# Patient Record
Sex: FEMALE | Race: WHITE | NOT HISPANIC OR LATINO | Employment: OTHER | URBAN - NONMETROPOLITAN AREA
[De-identification: names, ages, dates, MRNs, and addresses within clinical notes are randomized per-mention and may not be internally consistent; named-entity substitution may affect disease eponyms.]

---

## 2023-02-02 ENCOUNTER — HOSPITAL ENCOUNTER (INPATIENT)
Facility: HOSPITAL | Age: 73
LOS: 4 days | Discharge: HOME | DRG: 190 | End: 2023-02-07
Attending: FAMILY MEDICINE | Admitting: FAMILY MEDICINE
Payer: MEDICARE

## 2023-02-02 DIAGNOSIS — J44.1 COPD EXACERBATION: Primary | ICD-10-CM

## 2023-02-02 DIAGNOSIS — R05.9 COUGH IN ADULT: ICD-10-CM

## 2023-02-02 DIAGNOSIS — R07.9 CHEST PAIN: ICD-10-CM

## 2023-02-02 LAB
ALBUMIN SERPL-MCNC: 4.2 G/DL (ref 3.4–5)
ALBUMIN/GLOB SERPL: 1.4 RATIO
ALP SERPL-CCNC: 105 UNIT/L (ref 50–144)
ALT SERPL-CCNC: 55 UNIT/L (ref 1–45)
ANION GAP SERPL CALC-SCNC: 7 MEQ/L (ref 2–13)
APPEARANCE UR: CLEAR
AST SERPL-CCNC: 62 UNIT/L (ref 14–36)
BACTERIA #/AREA URNS AUTO: ABNORMAL /HPF
BASE EXCESS BLD CALC-SCNC: 2.3 MMOL/L
BASOPHILS # BLD AUTO: 0.04 X10(3)/MCL (ref 0.01–0.08)
BASOPHILS NFR BLD AUTO: 0.3 % (ref 0.1–1.2)
BILIRUB UR QL STRIP.AUTO: ABNORMAL MG/DL
BILIRUBIN DIRECT+TOT PNL SERPL-MCNC: 0.8 MG/DL (ref 0–1)
BLOOD GAS SAMPLE TYPE (OHS): ABNORMAL
BNP BLD-MCNC: 579 PG/ML (ref 10–450)
BUN SERPL-MCNC: 9 MG/DL (ref 7–20)
CALCIUM SERPL-MCNC: 9.6 MG/DL (ref 8.4–10.2)
CHLORIDE SERPL-SCNC: 102 MMOL/L (ref 98–110)
CO2 SERPL-SCNC: 28 MMOL/L (ref 21–32)
COLOR UR AUTO: YELLOW
CREAT SERPL-MCNC: 0.95 MG/DL (ref 0.66–1.25)
CREAT/UREA NIT SERPL: 9 (ref 12–20)
EOSINOPHIL # BLD AUTO: 0.02 X10(3)/MCL (ref 0.04–0.36)
EOSINOPHIL NFR BLD AUTO: 0.2 % (ref 0.7–7)
ERYTHROCYTE [DISTWIDTH] IN BLOOD BY AUTOMATED COUNT: 12.7 % (ref 11–14.5)
GFR SERPLBLD CREATININE-BSD FMLA CKD-EPI: 65 MLS/MIN/1.73/M2
GLOBULIN SER-MCNC: 3.1 GM/DL (ref 2–3.9)
GLUCOSE SERPL-MCNC: 114 MG/DL (ref 70–115)
GLUCOSE UR QL STRIP.AUTO: NEGATIVE MG/DL
GRAN CASTS URNS QL MICRO: ABNORMAL /LPF
HCO3 BLDA-SCNC: 27.9 MMOL/L
HCT VFR BLD AUTO: 44 % (ref 36–48)
HGB BLD-MCNC: 15.3 GM/DL (ref 11.8–16)
HYALINE CASTS URNS QL MICRO: ABNORMAL /LPF
IMM GRANULOCYTES # BLD AUTO: 0.04 X10(3)/MCL (ref 0–0.03)
IMM GRANULOCYTES NFR BLD AUTO: 0.3 % (ref 0–0.5)
INFLUENZA A (OHS): NEGATIVE
INFLUENZA B (OHS): NEGATIVE
KETONES UR QL STRIP.AUTO: ABNORMAL MG/DL
LACTATE SERPL-SCNC: 1.6 MMOL/L (ref 0.4–2)
LEUKOCYTE ESTERASE UR QL STRIP.AUTO: ABNORMAL UNIT/L
LYMPHOCYTES # BLD AUTO: 2.4 X10(3)/MCL (ref 1.16–3.74)
LYMPHOCYTES NFR BLD AUTO: 18.1 % (ref 20–55)
MAGNESIUM SERPL-MCNC: 1.9 MG/DL (ref 1.8–2.4)
MCH RBC QN AUTO: 31.9 PG (ref 27–34)
MCV RBC AUTO: 91.9 FL (ref 79–99)
MEAN CELL HEMOGLOBIN CONCENTRATION (OHS) G/DL: 34.8 G/DL (ref 31–37)
MONOCYTES # BLD AUTO: 0.92 X10(3)/MCL (ref 0.24–0.36)
MONOCYTES NFR BLD AUTO: 6.9 % (ref 4.7–12.5)
NEUTROPHILS # BLD AUTO: 9.87 X10(3)/MCL (ref 1.56–6.13)
NEUTROPHILS NFR BLD AUTO: 74.2 % (ref 37–73)
NITRITE UR QL STRIP.AUTO: NEGATIVE
NRBC BLD AUTO-RTO: 0 % (ref 0–1)
PCO2 BLDA: 46.6 MMHG (ref 35–45)
PH BLDA: 7.39 [PH] (ref 7.38–7.49)
PH UR STRIP.AUTO: 6.5 [PH]
PLATELET # BLD AUTO: 235 X10(3)/MCL (ref 140–371)
PMV BLD AUTO: 9.8 FL (ref 9.4–12.4)
PO2 BLDA: 67.5 MMHG (ref 80–100)
POTASSIUM SERPL-SCNC: 3.8 MMOL/L (ref 3.5–5.1)
PROT SERPL-MCNC: 7.3 GM/DL (ref 6.3–8.2)
PROT UR QL STRIP.AUTO: >=300 MG/DL
RBC # BLD AUTO: 4.79 X10(6)/MCL (ref 4–5.1)
RBC #/AREA URNS AUTO: ABNORMAL /HPF
RBC UR QL AUTO: ABNORMAL UNIT/L
SAO2 % BLDA: 93.2 %
SARS-COV-2 RDRP RESP QL NAA+PROBE: NEGATIVE
SODIUM SERPL-SCNC: 137 MMOL/L (ref 135–145)
SP GR UR STRIP.AUTO: >=1.03
SQUAMOUS #/AREA URNS AUTO: ABNORMAL /HPF
UROBILINOGEN UR STRIP-ACNC: 1 MG/DL
WBC # SPEC AUTO: 13.3 X10(3)/MCL (ref 4–11.5)
WBC #/AREA URNS AUTO: ABNORMAL /HPF

## 2023-02-02 PROCEDURE — 94799 UNLISTED PULMONARY SVC/PX: CPT

## 2023-02-02 PROCEDURE — 94640 AIRWAY INHALATION TREATMENT: CPT

## 2023-02-02 PROCEDURE — 94640 AIRWAY INHALATION TREATMENT: CPT | Mod: XB

## 2023-02-02 PROCEDURE — 25000242 PHARM REV CODE 250 ALT 637 W/ HCPCS: Performed by: FAMILY MEDICINE

## 2023-02-02 PROCEDURE — 25000003 PHARM REV CODE 250: Performed by: FAMILY MEDICINE

## 2023-02-02 PROCEDURE — 83605 ASSAY OF LACTIC ACID: CPT | Performed by: FAMILY MEDICINE

## 2023-02-02 PROCEDURE — 83735 ASSAY OF MAGNESIUM: CPT | Performed by: FAMILY MEDICINE

## 2023-02-02 PROCEDURE — G0378 HOSPITAL OBSERVATION PER HR: HCPCS

## 2023-02-02 PROCEDURE — 85025 COMPLETE CBC W/AUTO DIFF WBC: CPT | Performed by: FAMILY MEDICINE

## 2023-02-02 PROCEDURE — 81001 URINALYSIS AUTO W/SCOPE: CPT | Performed by: FAMILY MEDICINE

## 2023-02-02 PROCEDURE — 93005 ELECTROCARDIOGRAM TRACING: CPT

## 2023-02-02 PROCEDURE — 63600175 PHARM REV CODE 636 W HCPCS: Performed by: FAMILY MEDICINE

## 2023-02-02 PROCEDURE — 99900035 HC TECH TIME PER 15 MIN (STAT)

## 2023-02-02 PROCEDURE — 93010 ELECTROCARDIOGRAM REPORT: CPT | Mod: ,,, | Performed by: INTERNAL MEDICINE

## 2023-02-02 PROCEDURE — 87040 BLOOD CULTURE FOR BACTERIA: CPT | Performed by: FAMILY MEDICINE

## 2023-02-02 PROCEDURE — 87635 SARS-COV-2 COVID-19 AMP PRB: CPT | Performed by: FAMILY MEDICINE

## 2023-02-02 PROCEDURE — 63600175 PHARM REV CODE 636 W HCPCS: Performed by: INTERNAL MEDICINE

## 2023-02-02 PROCEDURE — 87088 URINE BACTERIA CULTURE: CPT | Performed by: FAMILY MEDICINE

## 2023-02-02 PROCEDURE — 94761 N-INVAS EAR/PLS OXIMETRY MLT: CPT

## 2023-02-02 PROCEDURE — 87400 INFLUENZA A/B EACH AG IA: CPT | Performed by: FAMILY MEDICINE

## 2023-02-02 PROCEDURE — 96372 THER/PROPH/DIAG INJ SC/IM: CPT | Performed by: FAMILY MEDICINE

## 2023-02-02 PROCEDURE — 99285 EMERGENCY DEPT VISIT HI MDM: CPT | Mod: 25

## 2023-02-02 PROCEDURE — 83880 ASSAY OF NATRIURETIC PEPTIDE: CPT | Performed by: FAMILY MEDICINE

## 2023-02-02 PROCEDURE — 27000221 HC OXYGEN, UP TO 24 HOURS

## 2023-02-02 PROCEDURE — 36415 COLL VENOUS BLD VENIPUNCTURE: CPT | Performed by: FAMILY MEDICINE

## 2023-02-02 PROCEDURE — 93010 EKG 12-LEAD: ICD-10-PCS | Mod: ,,, | Performed by: INTERNAL MEDICINE

## 2023-02-02 PROCEDURE — 80053 COMPREHEN METABOLIC PANEL: CPT | Performed by: FAMILY MEDICINE

## 2023-02-02 PROCEDURE — 25000003 PHARM REV CODE 250: Performed by: INTERNAL MEDICINE

## 2023-02-02 RX ORDER — ACETAMINOPHEN 325 MG/1
650 TABLET ORAL EVERY 6 HOURS PRN
Status: DISCONTINUED | OUTPATIENT
Start: 2023-02-02 | End: 2023-02-07 | Stop reason: HOSPADM

## 2023-02-02 RX ORDER — IPRATROPIUM BROMIDE AND ALBUTEROL SULFATE 2.5; .5 MG/3ML; MG/3ML
3 SOLUTION RESPIRATORY (INHALATION) EVERY 4 HOURS
Status: DISCONTINUED | OUTPATIENT
Start: 2023-02-02 | End: 2023-02-03

## 2023-02-02 RX ORDER — ALBUTEROL SULFATE 0.83 MG/ML
2.5 SOLUTION RESPIRATORY (INHALATION) EVERY 4 HOURS PRN
Status: DISCONTINUED | OUTPATIENT
Start: 2023-02-02 | End: 2023-02-07 | Stop reason: HOSPADM

## 2023-02-02 RX ORDER — PRAVASTATIN SODIUM 40 MG/1
40 TABLET ORAL NIGHTLY
COMMUNITY
Start: 2022-12-30

## 2023-02-02 RX ORDER — LISINOPRIL 5 MG/1
5 TABLET ORAL DAILY
Status: DISCONTINUED | OUTPATIENT
Start: 2023-02-03 | End: 2023-02-06

## 2023-02-02 RX ORDER — BUDESONIDE 0.5 MG/2ML
INHALANT ORAL 2 TIMES DAILY
COMMUNITY
Start: 2023-01-05

## 2023-02-02 RX ORDER — LISINOPRIL 5 MG/1
5 TABLET ORAL
Status: ON HOLD | COMMUNITY
Start: 2023-01-15 | End: 2023-02-07 | Stop reason: HOSPADM

## 2023-02-02 RX ORDER — ALBUTEROL SULFATE 90 UG/1
AEROSOL, METERED RESPIRATORY (INHALATION)
COMMUNITY

## 2023-02-02 RX ORDER — BUDESONIDE 0.5 MG/2ML
0.5 INHALANT ORAL 2 TIMES DAILY
Status: DISCONTINUED | OUTPATIENT
Start: 2023-02-02 | End: 2023-02-05

## 2023-02-02 RX ORDER — AZITHROMYCIN 250 MG/1
250 TABLET, FILM COATED ORAL DAILY
Status: COMPLETED | OUTPATIENT
Start: 2023-02-03 | End: 2023-02-07

## 2023-02-02 RX ORDER — ALBUTEROL SULFATE 90 UG/1
2 AEROSOL, METERED RESPIRATORY (INHALATION) EVERY 6 HOURS PRN
COMMUNITY
Start: 2023-01-02

## 2023-02-02 RX ORDER — ENOXAPARIN SODIUM 100 MG/ML
40 INJECTION SUBCUTANEOUS EVERY 24 HOURS
Status: DISCONTINUED | OUTPATIENT
Start: 2023-02-02 | End: 2023-02-07 | Stop reason: HOSPADM

## 2023-02-02 RX ORDER — IBUPROFEN 400 MG/1
400 TABLET ORAL EVERY 6 HOURS PRN
Status: DISCONTINUED | OUTPATIENT
Start: 2023-02-02 | End: 2023-02-07 | Stop reason: HOSPADM

## 2023-02-02 RX ORDER — CHOLESTYRAMINE 4 G/9G
POWDER, FOR SUSPENSION ORAL
COMMUNITY

## 2023-02-02 RX ORDER — DEXAMETHASONE SODIUM PHOSPHATE 4 MG/ML
4 INJECTION, SOLUTION INTRA-ARTICULAR; INTRALESIONAL; INTRAMUSCULAR; INTRAVENOUS; SOFT TISSUE
Status: COMPLETED | OUTPATIENT
Start: 2023-02-02 | End: 2023-02-02

## 2023-02-02 RX ORDER — PAROXETINE 10 MG/1
10 TABLET, FILM COATED ORAL DAILY
COMMUNITY

## 2023-02-02 RX ORDER — ALBUTEROL SULFATE 0.83 MG/ML
3 SOLUTION RESPIRATORY (INHALATION)
COMMUNITY

## 2023-02-02 RX ORDER — POTASSIUM CHLORIDE 750 MG/1
10 TABLET, EXTENDED RELEASE ORAL ONCE
Status: COMPLETED | OUTPATIENT
Start: 2023-02-02 | End: 2023-02-02

## 2023-02-02 RX ORDER — TALC
6 POWDER (GRAM) TOPICAL NIGHTLY PRN
Status: DISCONTINUED | OUTPATIENT
Start: 2023-02-02 | End: 2023-02-07 | Stop reason: HOSPADM

## 2023-02-02 RX ORDER — GUAIFENESIN 600 MG/1
600 TABLET, EXTENDED RELEASE ORAL 2 TIMES DAILY
Status: DISCONTINUED | OUTPATIENT
Start: 2023-02-02 | End: 2023-02-07 | Stop reason: HOSPADM

## 2023-02-02 RX ORDER — PAROXETINE 10 MG/1
10 TABLET, FILM COATED ORAL DAILY
Status: DISCONTINUED | OUTPATIENT
Start: 2023-02-03 | End: 2023-02-07 | Stop reason: HOSPADM

## 2023-02-02 RX ORDER — METHYLPREDNISOLONE SOD SUCC 125 MG
80 VIAL (EA) INJECTION EVERY 8 HOURS
Status: DISCONTINUED | OUTPATIENT
Start: 2023-02-02 | End: 2023-02-05

## 2023-02-02 RX ORDER — FUROSEMIDE 10 MG/ML
20 INJECTION INTRAMUSCULAR; INTRAVENOUS ONCE
Status: COMPLETED | OUTPATIENT
Start: 2023-02-02 | End: 2023-02-02

## 2023-02-02 RX ORDER — IPRATROPIUM BROMIDE AND ALBUTEROL SULFATE 2.5; .5 MG/3ML; MG/3ML
3 SOLUTION RESPIRATORY (INHALATION)
Status: COMPLETED | OUTPATIENT
Start: 2023-02-02 | End: 2023-02-02

## 2023-02-02 RX ORDER — SODIUM CHLORIDE 0.9 % (FLUSH) 0.9 %
3 SYRINGE (ML) INJECTION EVERY 6 HOURS PRN
Status: DISCONTINUED | OUTPATIENT
Start: 2023-02-02 | End: 2023-02-07 | Stop reason: HOSPADM

## 2023-02-02 RX ORDER — METHYLPREDNISOLONE SOD SUCC 125 MG
125 VIAL (EA) INJECTION
Status: COMPLETED | OUTPATIENT
Start: 2023-02-02 | End: 2023-02-02

## 2023-02-02 RX ORDER — DEXAMETHASONE SODIUM PHOSPHATE 4 MG/ML
4 INJECTION, SOLUTION INTRAMUSCULAR; INTRAVENOUS
Status: DISCONTINUED | OUTPATIENT
Start: 2023-02-02 | End: 2023-02-02

## 2023-02-02 RX ORDER — ALBUTEROL SULFATE 0.83 MG/ML
2.5 SOLUTION RESPIRATORY (INHALATION) EVERY 4 HOURS PRN
Status: DISCONTINUED | OUTPATIENT
Start: 2023-02-02 | End: 2023-02-02

## 2023-02-02 RX ORDER — DEXAMETHASONE SODIUM PHOSPHATE 4 MG/ML
1 INJECTION, SOLUTION INTRA-ARTICULAR; INTRALESIONAL; INTRAMUSCULAR; INTRAVENOUS; SOFT TISSUE
Status: DISCONTINUED | OUTPATIENT
Start: 2023-02-02 | End: 2023-02-02

## 2023-02-02 RX ORDER — DICLOFENAC SODIUM 75 MG/1
75 TABLET, DELAYED RELEASE ORAL DAILY
COMMUNITY
Start: 2023-01-28

## 2023-02-02 RX ORDER — ONDANSETRON 2 MG/ML
4 INJECTION INTRAMUSCULAR; INTRAVENOUS EVERY 6 HOURS PRN
Status: DISCONTINUED | OUTPATIENT
Start: 2023-02-02 | End: 2023-02-07 | Stop reason: HOSPADM

## 2023-02-02 RX ORDER — ARFORMOTEROL TARTRATE 15 UG/2ML
SOLUTION RESPIRATORY (INHALATION) 2 TIMES DAILY
COMMUNITY
Start: 2023-01-10 | End: 2023-02-02

## 2023-02-02 RX ADMIN — IPRATROPIUM BROMIDE AND ALBUTEROL SULFATE 3 ML: 2.5; .5 SOLUTION RESPIRATORY (INHALATION) at 01:02

## 2023-02-02 RX ADMIN — POTASSIUM CHLORIDE 10 MEQ: 750 TABLET, EXTENDED RELEASE ORAL at 08:02

## 2023-02-02 RX ADMIN — BUDESONIDE INHALATION 0.5 MG: 0.5 SUSPENSION RESPIRATORY (INHALATION) at 10:02

## 2023-02-02 RX ADMIN — ALBUTEROL SULFATE 2.5 MG: 2.5 SOLUTION RESPIRATORY (INHALATION) at 07:02

## 2023-02-02 RX ADMIN — METHYLPREDNISOLONE SODIUM SUCCINATE 125 MG: 125 INJECTION, POWDER, FOR SOLUTION INTRAMUSCULAR; INTRAVENOUS at 01:02

## 2023-02-02 RX ADMIN — ENOXAPARIN SODIUM 40 MG: 40 INJECTION SUBCUTANEOUS at 05:02

## 2023-02-02 RX ADMIN — MELATONIN TAB 3 MG 6 MG: 3 TAB at 08:02

## 2023-02-02 RX ADMIN — METHYLPREDNISOLONE SODIUM SUCCINATE 80 MG: 125 INJECTION, POWDER, FOR SOLUTION INTRAMUSCULAR; INTRAVENOUS at 09:02

## 2023-02-02 RX ADMIN — IPRATROPIUM BROMIDE AND ALBUTEROL SULFATE 3 ML: 2.5; .5 SOLUTION RESPIRATORY (INHALATION) at 10:02

## 2023-02-02 RX ADMIN — DEXAMETHASONE SODIUM PHOSPHATE 4 MG: 4 INJECTION, SOLUTION INTRA-ARTICULAR; INTRALESIONAL; INTRAMUSCULAR; INTRAVENOUS; SOFT TISSUE at 01:02

## 2023-02-02 RX ADMIN — IPRATROPIUM BROMIDE AND ALBUTEROL SULFATE 3 ML: 2.5; .5 SOLUTION RESPIRATORY (INHALATION) at 05:02

## 2023-02-02 RX ADMIN — ACETAMINOPHEN 650 MG: 325 TABLET, FILM COATED ORAL at 08:02

## 2023-02-02 RX ADMIN — CEFTRIAXONE 1 G: 1 INJECTION, SOLUTION INTRAVENOUS at 02:02

## 2023-02-02 RX ADMIN — GUAIFENESIN 600 MG: 600 TABLET, EXTENDED RELEASE ORAL at 08:02

## 2023-02-02 RX ADMIN — FUROSEMIDE 20 MG: 10 INJECTION, SOLUTION INTRAVENOUS at 09:02

## 2023-02-02 NOTE — ED PROVIDER NOTES
Encounter Date: 2/2/2023       History     Chief Complaint   Patient presents with    Cough     PT WAS SENT FROM URGENT CARE. AMB TO ED WITH C/O SOB, FEVER , H/A , COUGHING UP GREEN MUCUS. BLOOD IN HER URINE    Shortness of Breath     PATIENT IS A 72-YEAR-OLD FEMALE WITH A HISTORY OF COPD, SHE IS FOLLOWED BY AN OUTSIDE PCP AND PULMONOLOGIST.  SHE DOES CONTINUE TO SMOKE APPROXIMATELY HALF PACK PER DAY.  SHE RELATES 3 DAY HISTORY OF COUGH PRODUCTIVE OF GREENISH SPUTUM, SUBJECTIVE FEVERS.  SHE ALSO HAS A LONG HISTORY OF MICROSCOPIC HEMATURIA WHICH HAS BEEN FOLLOWED BY UROLOGIST.  SHE DOES RELATE CHANGES IN HER URINE TODAY WITHOUT SUPRAPUBIC PAIN OR DYSURIA.  SHE WAS SENT TO THE ED FROM URGENT CARE      Review of patient's allergies indicates:   Allergen Reactions    Amoxicillin     Nitrofuran analogues      Past Medical History:   Diagnosis Date    COPD (chronic obstructive pulmonary disease)     Emphysema of lung     Unspecified chronic bronchitis      Past Surgical History:   Procedure Laterality Date    CHOLECYSTECTOMY      EYE SURGERY      HYSTERECTOMY       History reviewed. No pertinent family history.  Social History     Tobacco Use    Smoking status: Every Day     Packs/day: 0.50     Types: Cigarettes    Smokeless tobacco: Never   Substance Use Topics    Alcohol use: Never    Drug use: Never     Review of Systems   Constitutional:  Positive for fever.   Respiratory:  Positive for cough and shortness of breath.         GREEN SPUTUM     Gastrointestinal: Negative.    Genitourinary:         Reports changes in urinary color     Physical Exam     Initial Vitals [02/02/23 1150]   BP Pulse Resp Temp SpO2   126/63 75 20 97.3 °F (36.3 °C) (!) 93 %      MAP       --         Physical Exam    Constitutional: She appears well-developed and well-nourished.   HENT:   Head: Normocephalic and atraumatic.   Cardiovascular:  Normal rate, regular rhythm and normal heart sounds.           Pulmonary/Chest: She is in respiratory  distress. She has wheezes. She has rhonchi.   Abdominal: Abdomen is soft. Bowel sounds are normal.   Musculoskeletal:         General: Normal range of motion.     Neurological: She is alert and oriented to person, place, and time.       ED Course   Procedures  Labs Reviewed   COMPREHENSIVE METABOLIC PANEL - Abnormal; Notable for the following components:       Result Value    Alanine Aminotransferase 55 (*)     Aspartate Aminotransferase 62 (*)     BUN/Creatinine Ratio 9 (*)     All other components within normal limits   CBC WITH DIFFERENTIAL - Abnormal; Notable for the following components:    WBC 13.3 (*)     Neut % 74.2 (*)     Lymph % 18.1 (*)     Eos % 0.2 (*)     Neut # 9.87 (*)     Mono # 0.92 (*)     Eos # 0.02 (*)     IG# 0.04 (*)     All other components within normal limits   URINALYSIS - Abnormal; Notable for the following components:    Protein, UA >=300 (*)     Ketones, UA Trace (*)     Blood, UA Large (*)     Bilirubin, UA Moderate (*)     Leukocyte Esterase, UA Trace (*)     All other components within normal limits    Narrative:      URINE STABILITY IS 2 HOURS AT ROOM TEMP OR    SIX HOURS REFRIGERATED. PERFORMING TESTING ON    SPECIMENS GREATER THAN THIS AGE MAY AFFECT THE    FOLLOWING TESTS:    PH          SPECIFIC GRAVITY           BLOOD    CLARITY     BILIRUBIN               UROBILINOGEN   BLOOD GAS - Abnormal; Notable for the following components:    pCO2, Blood gas 46.6 (*)     pO2, Blood gas 67.5 (*)     All other components within normal limits   NT-PRO NATRIURETIC PEPTIDE - Abnormal; Notable for the following components:    ProBNP 579 (*)     All other components within normal limits   URINALYSIS, MICROSCOPIC - Abnormal; Notable for the following components:    Bacteria, UA Trace (*)     Hyaline Casts, UA Few (*)     Granular Casts, UA Few (*)     RBC, UA 6-10 (*)     WBC, UA 6-10 (*)     All other components within normal limits   RAPID INFLUENZA A/B - Normal   LACTIC ACID, PLASMA - Normal    SARS-COV-2 RNA AMPLIFICATION, QUAL - Normal   MAGNESIUM - Normal   BLOOD CULTURE [facility]   BLOOD CULTURE [facility]   CULTURE, URINE   CBC W/ AUTO DIFFERENTIAL    Narrative:     The following orders were created for panel order CBC auto differential.  Procedure                               Abnormality         Status                     ---------                               -----------         ------                     CBC with Differential[[id number]]        Abnormal            Final result                 Please view results for these tests on the individual orders.     EKG Readings: (Independently Interpreted)   Rhythm: Normal Sinus Rhythm. Heart Rate: 68. Ectopy: No Ectopy. Conduction: Normal. ST Segments: Normal ST Segments. T Waves: Normal. Clinical Impression: Normal Sinus Rhythm     Imaging Results              X-Ray Chest AP Portable (Final result)  Result time 02/02/23 13:10:13      Final result by Lino Lafleur III, MD (02/02/23 13:10:13)                   Impression:      1. Chronic changes are present as described above including findings compatible with COPD.  2. I see no definite infiltrates or other significant abnormalities.      Electronically signed by: Lino Lafleur  Date:    02/02/2023  Time:    13:10               Narrative:    EXAMINATION:  STUDY: XR CHEST AP PORTABLE    CLINICAL HISTORY AND TECHNIQUE:  RT Ismael on 2/2/2023  1:06 PM    CURRENT CLINICAL HISTORY: ER PT    X 1-2 DAYS    -SOB, FEVER, HEADACHE, COUGH    PAST MEDICAL HISTORY: COPD, SMOKER, CV    TECHNIQUE: 1 VIEW CHEST PORTABLE    TECHNOLOGIST: KIERSTEN    COMPARISON:  None    FINDINGS:  The cardiac and mediastinal contours are unremarkable.  The lungs are moderately hyperexpanded with prominence of proximal pulmonary vasculature and mildly increased interstitial lung markings compatible with patient's history of COPD.I see no lobar or segmental infiltrates.No significant pleural effusions are noted.There is moderate demineralization of  the skeletal structures.  Mild atherosclerotic plaquing is noted within the aortic arch.                                       Medications   cefTRIAXone (ROCEPHIN) 1 g/50 mL D5W IVPB (has no administration in time range)   albuterol-ipratropium 2.5 mg-0.5 mg/3 mL nebulizer solution 3 mL (3 mLs Nebulization Given 2/2/23 1346)   methylPREDNISolone sodium succinate injection 125 mg (125 mg Intramuscular Given 2/2/23 1346)   dexAMETHasone injection 4 mg (4 mg Other Given 2/2/23 1347)                              Clinical Impression:   Final diagnoses:  [R05.9] Cough in adult  [R07.9] Chest pain  [J44.1] COPD exacerbation (Primary)        ED Disposition Condition    Observation Stable                Jason Shen MD  02/02/23 1374

## 2023-02-03 PROBLEM — F32.A DEPRESSION: Status: ACTIVE | Noted: 2023-02-03

## 2023-02-03 PROBLEM — J44.1 COPD EXACERBATION: Status: ACTIVE | Noted: 2023-02-03

## 2023-02-03 PROBLEM — I10 HTN (HYPERTENSION): Status: ACTIVE | Noted: 2023-02-03

## 2023-02-03 LAB
ANION GAP SERPL CALC-SCNC: 6 MEQ/L (ref 2–13)
BASOPHILS # BLD AUTO: 0 X10(3)/MCL (ref 0.01–0.08)
BASOPHILS NFR BLD AUTO: 0 % (ref 0.1–1.2)
BUN SERPL-MCNC: 20 MG/DL (ref 7–20)
CALCIUM SERPL-MCNC: 8.8 MG/DL (ref 8.4–10.2)
CHLORIDE SERPL-SCNC: 101 MMOL/L (ref 98–110)
CO2 SERPL-SCNC: 27 MMOL/L (ref 21–32)
CREAT SERPL-MCNC: 0.91 MG/DL (ref 0.66–1.25)
CREAT/UREA NIT SERPL: 22 (ref 12–20)
EOSINOPHIL # BLD AUTO: 0 X10(3)/MCL (ref 0.04–0.36)
EOSINOPHIL NFR BLD AUTO: 0 % (ref 0.7–7)
ERYTHROCYTE [DISTWIDTH] IN BLOOD BY AUTOMATED COUNT: 12.7 % (ref 11–14.5)
GFR SERPLBLD CREATININE-BSD FMLA CKD-EPI: 68 MLS/MIN/1.73/M2
GLUCOSE SERPL-MCNC: 182 MG/DL (ref 70–115)
HCT VFR BLD AUTO: 41.1 % (ref 36–48)
HGB BLD-MCNC: 14.2 GM/DL (ref 11.8–16)
IMM GRANULOCYTES # BLD AUTO: 0.02 X10(3)/MCL (ref 0–0.03)
IMM GRANULOCYTES NFR BLD AUTO: 0.2 % (ref 0–0.5)
LYMPHOCYTES # BLD AUTO: 1.24 X10(3)/MCL (ref 1.16–3.74)
LYMPHOCYTES NFR BLD AUTO: 12.5 % (ref 20–55)
MAGNESIUM SERPL-MCNC: 2 MG/DL (ref 1.8–2.4)
MCH RBC QN AUTO: 31.8 PG (ref 27–34)
MCV RBC AUTO: 92.2 FL (ref 79–99)
MEAN CELL HEMOGLOBIN CONCENTRATION (OHS) G/DL: 34.5 G/DL (ref 31–37)
MONOCYTES # BLD AUTO: 0.25 X10(3)/MCL (ref 0.24–0.36)
MONOCYTES NFR BLD AUTO: 2.5 % (ref 4.7–12.5)
NEUTROPHILS # BLD AUTO: 8.43 X10(3)/MCL (ref 1.56–6.13)
NEUTROPHILS NFR BLD AUTO: 84.8 % (ref 37–73)
NRBC BLD AUTO-RTO: 0 % (ref 0–1)
PLATELET # BLD AUTO: 249 X10(3)/MCL (ref 140–371)
PMV BLD AUTO: 10.1 FL (ref 9.4–12.4)
POTASSIUM SERPL-SCNC: 4.2 MMOL/L (ref 3.5–5.1)
RBC # BLD AUTO: 4.46 X10(6)/MCL (ref 4–5.1)
SODIUM SERPL-SCNC: 134 MMOL/L (ref 135–145)
WBC # SPEC AUTO: 9.9 X10(3)/MCL (ref 4–11.5)

## 2023-02-03 PROCEDURE — 83735 ASSAY OF MAGNESIUM: CPT | Performed by: INTERNAL MEDICINE

## 2023-02-03 PROCEDURE — 94761 N-INVAS EAR/PLS OXIMETRY MLT: CPT

## 2023-02-03 PROCEDURE — 63600175 PHARM REV CODE 636 W HCPCS: Performed by: FAMILY MEDICINE

## 2023-02-03 PROCEDURE — 87070 CULTURE OTHR SPECIMN AEROBIC: CPT | Performed by: INTERNAL MEDICINE

## 2023-02-03 PROCEDURE — 36415 COLL VENOUS BLD VENIPUNCTURE: CPT | Performed by: HOSPITALIST

## 2023-02-03 PROCEDURE — 25000003 PHARM REV CODE 250: Performed by: FAMILY MEDICINE

## 2023-02-03 PROCEDURE — 85025 COMPLETE CBC W/AUTO DIFF WBC: CPT | Performed by: HOSPITALIST

## 2023-02-03 PROCEDURE — 27000221 HC OXYGEN, UP TO 24 HOURS

## 2023-02-03 PROCEDURE — 80048 BASIC METABOLIC PNL TOTAL CA: CPT | Performed by: HOSPITALIST

## 2023-02-03 PROCEDURE — 99900035 HC TECH TIME PER 15 MIN (STAT)

## 2023-02-03 PROCEDURE — 25000242 PHARM REV CODE 250 ALT 637 W/ HCPCS: Performed by: FAMILY MEDICINE

## 2023-02-03 PROCEDURE — 25000003 PHARM REV CODE 250: Performed by: INTERNAL MEDICINE

## 2023-02-03 PROCEDURE — 63700000 PHARM REV CODE 250 ALT 637 W/O HCPCS: Performed by: INTERNAL MEDICINE

## 2023-02-03 PROCEDURE — 11000001 HC ACUTE MED/SURG PRIVATE ROOM

## 2023-02-03 PROCEDURE — 94640 AIRWAY INHALATION TREATMENT: CPT

## 2023-02-03 RX ADMIN — IPRATROPIUM BROMIDE AND ALBUTEROL SULFATE 3 ML: 2.5; .5 SOLUTION RESPIRATORY (INHALATION) at 08:02

## 2023-02-03 RX ADMIN — ENOXAPARIN SODIUM 40 MG: 40 INJECTION SUBCUTANEOUS at 05:02

## 2023-02-03 RX ADMIN — CEFTRIAXONE SODIUM 1 G: 1 INJECTION, POWDER, FOR SOLUTION INTRAMUSCULAR; INTRAVENOUS at 09:02

## 2023-02-03 RX ADMIN — ACETAMINOPHEN 650 MG: 325 TABLET, FILM COATED ORAL at 06:02

## 2023-02-03 RX ADMIN — METHYLPREDNISOLONE SODIUM SUCCINATE 80 MG: 125 INJECTION, POWDER, FOR SOLUTION INTRAMUSCULAR; INTRAVENOUS at 05:02

## 2023-02-03 RX ADMIN — GUAIFENESIN 600 MG: 600 TABLET, EXTENDED RELEASE ORAL at 08:02

## 2023-02-03 RX ADMIN — GUAIFENESIN 600 MG: 600 TABLET, EXTENDED RELEASE ORAL at 09:02

## 2023-02-03 RX ADMIN — BUDESONIDE INHALATION 0.5 MG: 0.5 SUSPENSION RESPIRATORY (INHALATION) at 08:02

## 2023-02-03 RX ADMIN — AZITHROMYCIN MONOHYDRATE 250 MG: 250 TABLET ORAL at 09:02

## 2023-02-03 RX ADMIN — PAROXETINE 10 MG: 10 TABLET, FILM COATED ORAL at 09:02

## 2023-02-03 RX ADMIN — ALBUTEROL SULFATE 2.5 MG: 2.5 SOLUTION RESPIRATORY (INHALATION) at 03:02

## 2023-02-03 RX ADMIN — ALBUTEROL SULFATE 2.5 MG: 2.5 SOLUTION RESPIRATORY (INHALATION) at 08:02

## 2023-02-03 RX ADMIN — MELATONIN TAB 3 MG 6 MG: 3 TAB at 08:02

## 2023-02-03 RX ADMIN — ALBUTEROL SULFATE 2.5 MG: 2.5 SOLUTION RESPIRATORY (INHALATION) at 11:02

## 2023-02-03 RX ADMIN — METHYLPREDNISOLONE SODIUM SUCCINATE 80 MG: 125 INJECTION, POWDER, FOR SOLUTION INTRAMUSCULAR; INTRAVENOUS at 09:02

## 2023-02-03 RX ADMIN — METHYLPREDNISOLONE SODIUM SUCCINATE 80 MG: 125 INJECTION, POWDER, FOR SOLUTION INTRAMUSCULAR; INTRAVENOUS at 03:02

## 2023-02-03 RX ADMIN — IPRATROPIUM BROMIDE AND ALBUTEROL SULFATE 3 ML: 2.5; .5 SOLUTION RESPIRATORY (INHALATION) at 04:02

## 2023-02-03 RX ADMIN — LISINOPRIL 5 MG: 5 TABLET ORAL at 09:02

## 2023-02-03 NOTE — H&P
[facility] Medicine  History & Physical    Patient Name: Shira Morales  MRN: [id number]  Patient Class: OP- Observation  Admission Date: 2/2/2023  Attending Physician: Anthony Claudio MD  Primary Care Provider: Primary Doctor No         Patient information was obtained from via telemed    Subjective:     Principal Problem:<principal problem not specified>    Chief Complaint:   Chief Complaint   Patient presents with    Cough     PT WAS SENT FROM URGENT CARE. AMB TO ED WITH C/O SOB, FEVER , H/A , COUGHING UP GREEN MUCUS. BLOOD IN HER URINE    Shortness of Breath        HPI: 71 yo female with hx of COPD continues to smoke, HTN, Depression presents with 3 day hx of productive cough yellow green, fever, urinary symptoms. Stating she has been coughing SOB, wheezing, No N/V/D.      Pt is Full code SDM is her daughter   Pt is see and examined via Telemed. Pt is in [facility]. I am in [place], [place]. Nursing staff assisted with evaluation. Software is Audio/ Video  Pt is being admitted as an observation for further evaluation and treatment      Past Medical History:   Diagnosis Date    Chronic neck pain     COPD (chronic obstructive pulmonary disease)     Emphysema of lung     Unspecified chronic bronchitis        Past Surgical History:   Procedure Laterality Date    CHOLECYSTECTOMY      EYE SURGERY      HYSTERECTOMY         Review of patient's allergies indicates:   Allergen Reactions    Amoxicillin     Nitrofuran analogues        No current facility-administered medications on file prior to encounter.     Current Outpatient Medications on File Prior to Encounter   Medication Sig    albuterol (PROVENTIL) 2.5 mg /3 mL (0.083 %) nebulizer solution 3 mLs.    albuterol (PROVENTIL/VENTOLIN HFA) 90 mcg/actuation inhaler 2 puffs every 6 (six) hours as needed.    albuterol (PROVENTIL/VENTOLIN HFA) 90 mcg/actuation inhaler Ventolin HFA 90 mcg/actuation aerosol inhaler    budesonide  (PULMICORT) 0.5 mg/2 mL nebulizer solution Take by nebulization 2 (two) times daily.    cholestyramine (QUESTRAN) 4 gram packet cholestyramine (with sugar) 4 gram powder for susp in a packet    diclofenac (VOLTAREN) 75 MG EC tablet Take 75 mg by mouth 2 (two) times daily.    lisinopriL (PRINIVIL,ZESTRIL) 5 MG tablet Take 5 mg by mouth.    paroxetine (PAXIL) 10 MG tablet Take 10 mg by mouth once daily.    pravastatin (PRAVACHOL) 40 MG tablet Take 40 mg by mouth every evening.    [DISCONTINUED] arformoteroL (BROVANA) 15 mcg/2 mL Nebu Take by nebulization 2 (two) times daily.     Family History    None       Tobacco Use    Smoking status: Every Day     Packs/day: 0.50     Types: Cigarettes    Smokeless tobacco: Never   Substance and Sexual Activity    Alcohol use: Never    Drug use: Never    Sexual activity: Not on file     Review of Systems   Constitutional:  Positive for fever.   HENT: Negative.     Eyes: Negative.    Respiratory:  Positive for cough, shortness of breath and wheezing.    Cardiovascular: Negative.    Endocrine: Negative.    Genitourinary: Negative.    Musculoskeletal: Negative.    Skin: Negative.    Allergic/Immunologic: Negative.    Neurological: Negative.    Hematological: Negative.    Psychiatric/Behavioral: Negative.     Objective:     Vital Signs (Most Recent):  Temp: 97.7 °F (36.5 °C) (02/02/23 1812)  Pulse: 71 (02/02/23 1936)  Resp: 18 (02/02/23 1936)  BP: 114/63 (02/02/23 1812)  SpO2: (!) 93 % (02/02/23 1936)   Vital Signs (24h Range):  Temp:  [97.3 °F (36.3 °C)-98 °F (36.7 °C)] 97.7 °F (36.5 °C)  Pulse:  [68-83] 71  Resp:  [18-20] 18  SpO2:  [93 %-96 %] 93 %  BP: (101-137)/(46-73) 114/63     Weight: 66.7 kg (147 lb 1.6 oz)  Body mass index is 23.04 kg/m².    Physical Exam  Constitutional:       Appearance: Normal appearance.   HENT:      Head: Normocephalic and atraumatic.      Nose: Nose normal.   Eyes:      Extraocular Movements: Extraocular movements intact.       Conjunctiva/sclera: Conjunctivae normal.      Pupils: Pupils are equal, round, and reactive to light.   Cardiovascular:      Rate and Rhythm: Normal rate and regular rhythm.      Pulses: Normal pulses.      Heart sounds: Normal heart sounds.   Pulmonary:      Breath sounds: Normal breath sounds.   Abdominal:      General: Abdomen is flat. Bowel sounds are normal.      Palpations: Abdomen is soft.   Musculoskeletal:         General: Normal range of motion.      Cervical back: Normal range of motion and neck supple.   Skin:     General: Skin is warm and dry.   Neurological:      General: No focal deficit present.      Mental Status: She is alert and oriented to person, place, and time. Mental status is at baseline.   Psychiatric:         Mood and Affect: Mood normal.         CRANIAL NERVES     CN III, IV, VI   Pupils are equal, round, and reactive to light.     Significant Labs: All pertinent labs within the past 24 hours have been reviewed.  ABGs: No results for input(s): PH, PCO2, HCO3, POCSATURATED, BE, TOTALHB, COHB, METHB, O2HB, POCFIO2, PO2 in the last 48 hours.  Bilirubin:   Recent Labs   Lab 02/02/23  1301   BILITOT 0.8     Blood Culture: No results for input(s): LABBLOO in the last 48 hours.  BMP:   Recent Labs   Lab 02/02/23  1301      K 3.8   CO2 28   BUN 9.0   CREATININE 0.95   CALCIUM 9.6   MG 1.90     CBC:   Recent Labs   Lab 02/02/23  1301   WBC 13.3*   HGB 15.3   HCT 44.0        CMP:   Recent Labs   Lab 02/02/23  1301      K 3.8   CO2 28   BUN 9.0   CREATININE 0.95   CALCIUM 9.6   ALBUMIN 4.2   BILITOT 0.8   ALKPHOS 105   AST 62*   ALT 55*     Cardiac Markers: No results for input(s): CKMB, MYOGLOBIN, BNP, TROPISTAT in the last 48 hours.  Coagulation: No results for input(s): PT, INR, APTT in the last 48 hours.  Lactic Acid: No results for input(s): LACTATE in the last 48 hours.  Lipase: No results for input(s): LIPASE in the last 48 hours.  Lipid Panel: No results for input(s):  CHOL, HDL, LDLCALC, TRIG, CHOLHDL in the last 48 hours.  Magnesium:   Recent Labs   Lab 02/02/23  1301   MG 1.90     Pathology Results  (Last 10 years)      None          POCT Glucose: No results for input(s): POCTGLUCOSE in the last 48 hours.  Prealbumin: No results for input(s): PREALBUMIN in the last 48 hours.  Respiratory Culture: No results for input(s): GSRESP, RESPIRATORYC in the last 48 hours.  Troponin: No results for input(s): TROPONINI, TROPONINIHS in the last 48 hours.  TSH: No results for input(s): TSH in the last 4320 hours.  Urine Culture: No results for input(s): LABURIN in the last 48 hours.  Urine Studies:   Recent Labs   Lab 02/02/23  1239   APPEARANCEUA Clear   PROTEINUA >=300*   BILIRUBINUA Moderate*   UROBILINOGEN 1.0   LEUKOCYTESUR Trace*   RBCUA 6-10*   WBCUA 6-10*   BACTERIA Trace*       Significant Imaging:   CXR COPD changes, no infiltrate    Assessment/Plan:     No notes have been filed under this hospital service.  Service: Hospital Medicine    VTE Risk Mitigation (From admission, onward)         Ordered     enoxaparin injection 40 mg  Daily         02/02/23 1642     IP VTE HIGH RISK PATIENT  Once         02/02/23 1642            -Acute hypoxic resp failure due to copd exacerbation add oxygen per NC. Will give one dose lasix IV once  -Sepsis due to UTI/ COPD exacerbation/ acute bronchitis  -COPD exacerbation with acute bronchitis Continue Solumedrol  IV, Add azithromycin Mucinex, Nebs  - UTI urine culture obtained, Will continue on Rocephin  -HTN continue on Lisinopril  -Depression continue SSRI  -Tob abuse pt was counseled to quit smoking  -SVT ppx on Lovenox   Pt is Full code SDM is her daughter Susan Garcia  Pt is see and examined via Telemed. Pt is in [facility]. I am in [place], [place]. Nursing staff assisted with evaluation. Software is Audio/ Video  Pt is being admitted as an observation for further evaluation and treatment        Anthony Claudio MD  Department of Hospital Medicine    [facility]

## 2023-02-03 NOTE — PROGRESS NOTES
[facility] Medicine  Progress Note    Patient Name: Shira Morales  MRN: [id number]  Patient Class: OP- Observation   Admission Date: 2/2/2023  Length of Stay: 0 days  Attending Physician: Anne Marie Iyer MD  Primary Care Provider: Primary Doctor No        Subjective:     Principal Problem:COPD exacerbation        HPI:  71 yo female with hx of COPD continues to smoke, HTN, Depression presents with 3 day hx of productive cough yellow green, fever, urinary symptoms. Stating she has been coughing SOB, wheezing, No N/V/D.      Pt is Full code SDM is her daughter   Pt is see and examined via Telemed. Pt is in [facility]. I am in [place], [place]. Nursing staff assisted with evaluation. Software is Audio/ Video  Pt is being admitted as an observation for further evaluation and treatment      Overview/Hospital Course:  February 3, 2023 patient doing better.  Less wheezing.  She is admitting to still smoking 8 cigarettes per day.  Smoking cessation counseling done.      Interval History:     Review of Systems   Constitutional:  Negative for activity change, appetite change, chills, diaphoresis, fatigue and fever.   HENT:  Negative for congestion, ear pain, rhinorrhea and sore throat.    Eyes:  Negative for pain, discharge and redness.   Respiratory:  Positive for cough, shortness of breath and wheezing. Negative for apnea, choking and chest tightness.    Cardiovascular:  Negative for chest pain, palpitations and leg swelling.   Gastrointestinal:  Negative for abdominal distention, abdominal pain, blood in stool, constipation, diarrhea, nausea and vomiting.   Endocrine: Negative for cold intolerance, heat intolerance and polyuria.   Genitourinary:  Negative for difficulty urinating, dysuria, frequency, hematuria and pelvic pain.   Musculoskeletal:  Negative for arthralgias, back pain, gait problem, joint swelling and myalgias.   Skin:  Negative for color change, pallor, rash and wound.    Neurological:  Negative for seizures, syncope, speech difficulty, numbness and headaches.   Psychiatric/Behavioral:  Negative for agitation, confusion, hallucinations and sleep disturbance. The patient is not nervous/anxious.    Objective:     Vital Signs (Most Recent):  Temp: 97.6 °F (36.4 °C) (02/03/23 1105)  Pulse: 68 (02/03/23 1153)  Resp: 20 (02/03/23 1153)  BP: (!) 154/73 (02/03/23 1105)  SpO2: 96 % (02/03/23 1153)   Vital Signs (24h Range):  Temp:  [97.6 °F (36.4 °C)-98 °F (36.7 °C)] 97.6 °F (36.4 °C)  Pulse:  [58-83] 68  Resp:  [16-22] 20  SpO2:  [91 %-96 %] 96 %  BP: ()/(46-73) 154/73     Weight: 66.7 kg (147 lb 1.6 oz)  Body mass index is 23.04 kg/m².    Intake/Output Summary (Last 24 hours) at 2/3/2023 1307  Last data filed at 2/3/2023 1242  Gross per 24 hour   Intake 720 ml   Output --   Net 720 ml      Physical Exam  Constitutional:       General: She is not in acute distress.     Appearance: Normal appearance. She is not ill-appearing, toxic-appearing or diaphoretic.   HENT:      Head: Normocephalic and atraumatic.      Right Ear: External ear normal.      Left Ear: External ear normal.      Nose: Nose normal. No congestion or rhinorrhea.      Mouth/Throat:      Mouth: Mucous membranes are moist.      Pharynx: Oropharynx is clear.   Eyes:      Extraocular Movements: Extraocular movements intact.      Conjunctiva/sclera: Conjunctivae normal.      Pupils: Pupils are equal, round, and reactive to light.   Neck:      Vascular: No carotid bruit.   Cardiovascular:      Rate and Rhythm: Normal rate and regular rhythm.      Pulses: Normal pulses.      Heart sounds: Normal heart sounds. No murmur heard.  Pulmonary:      Effort: Pulmonary effort is normal. No respiratory distress.      Breath sounds: Wheezing, rhonchi and rales present.   Abdominal:      General: Abdomen is flat. Bowel sounds are normal. There is no distension.      Palpations: Abdomen is soft.      Tenderness: There is no abdominal  tenderness. There is no guarding.   Musculoskeletal:         General: No swelling or tenderness. Normal range of motion.      Cervical back: Normal range of motion and neck supple. No tenderness.      Right lower leg: No edema.      Left lower leg: No edema.   Lymphadenopathy:      Cervical: No cervical adenopathy.   Skin:     General: Skin is warm and dry.      Coloration: Skin is not jaundiced or pale.   Neurological:      General: No focal deficit present.      Mental Status: She is alert and oriented to person, place, and time. Mental status is at baseline.      Cranial Nerves: No cranial nerve deficit.      Motor: No weakness.      Coordination: Coordination normal.      Gait: Gait normal.   Psychiatric:         Mood and Affect: Mood normal.         Behavior: Behavior normal.         Thought Content: Thought content normal.         Judgment: Judgment normal.       Significant Labs: All pertinent labs within the past 24 hours have been reviewed.    Significant Imaging: I have reviewed all pertinent imaging results/findings within the past 24 hours.      Assessment/Plan:      No notes have been filed under this hospital service.  Service: Hospital Medicine    VTE Risk Mitigation (From admission, onward)         Ordered     enoxaparin injection 40 mg  Daily         02/02/23 1642     IP VTE HIGH RISK PATIENT  Once         02/02/23 1642                Discharge Planning   INGA:      Code Status: Full Code   Is the patient medically ready for discharge?:     Reason for patient still in hospital (select all that apply): Patient unstable  Discharge Plan A: Home                  Georgi Jackson MD  Department of Hospital Medicine   [facility]

## 2023-02-03 NOTE — SUBJECTIVE & OBJECTIVE
Interval History:     Review of Systems   Constitutional:  Negative for activity change, appetite change, chills, diaphoresis, fatigue and fever.   HENT:  Negative for congestion, ear pain, rhinorrhea and sore throat.    Eyes:  Negative for pain, discharge and redness.   Respiratory:  Positive for cough, shortness of breath and wheezing. Negative for apnea, choking and chest tightness.    Cardiovascular:  Negative for chest pain, palpitations and leg swelling.   Gastrointestinal:  Negative for abdominal distention, abdominal pain, blood in stool, constipation, diarrhea, nausea and vomiting.   Endocrine: Negative for cold intolerance, heat intolerance and polyuria.   Genitourinary:  Negative for difficulty urinating, dysuria, frequency, hematuria and pelvic pain.   Musculoskeletal:  Negative for arthralgias, back pain, gait problem, joint swelling and myalgias.   Skin:  Negative for color change, pallor, rash and wound.   Neurological:  Negative for seizures, syncope, speech difficulty, numbness and headaches.   Psychiatric/Behavioral:  Negative for agitation, confusion, hallucinations and sleep disturbance. The patient is not nervous/anxious.    Objective:     Vital Signs (Most Recent):  Temp: 97.6 °F (36.4 °C) (02/03/23 1105)  Pulse: 68 (02/03/23 1153)  Resp: 20 (02/03/23 1153)  BP: (!) 154/73 (02/03/23 1105)  SpO2: 96 % (02/03/23 1153)   Vital Signs (24h Range):  Temp:  [97.6 °F (36.4 °C)-98 °F (36.7 °C)] 97.6 °F (36.4 °C)  Pulse:  [58-83] 68  Resp:  [16-22] 20  SpO2:  [91 %-96 %] 96 %  BP: ()/(46-73) 154/73     Weight: 66.7 kg (147 lb 1.6 oz)  Body mass index is 23.04 kg/m².    Intake/Output Summary (Last 24 hours) at 2/3/2023 1307  Last data filed at 2/3/2023 1242  Gross per 24 hour   Intake 720 ml   Output --   Net 720 ml      Physical Exam  Constitutional:       General: She is not in acute distress.     Appearance: Normal appearance. She is not ill-appearing, toxic-appearing or diaphoretic.   HENT:       Head: Normocephalic and atraumatic.      Right Ear: External ear normal.      Left Ear: External ear normal.      Nose: Nose normal. No congestion or rhinorrhea.      Mouth/Throat:      Mouth: Mucous membranes are moist.      Pharynx: Oropharynx is clear.   Eyes:      Extraocular Movements: Extraocular movements intact.      Conjunctiva/sclera: Conjunctivae normal.      Pupils: Pupils are equal, round, and reactive to light.   Neck:      Vascular: No carotid bruit.   Cardiovascular:      Rate and Rhythm: Normal rate and regular rhythm.      Pulses: Normal pulses.      Heart sounds: Normal heart sounds. No murmur heard.  Pulmonary:      Effort: Pulmonary effort is normal. No respiratory distress.      Breath sounds: Wheezing, rhonchi and rales present.   Abdominal:      General: Abdomen is flat. Bowel sounds are normal. There is no distension.      Palpations: Abdomen is soft.      Tenderness: There is no abdominal tenderness. There is no guarding.   Musculoskeletal:         General: No swelling or tenderness. Normal range of motion.      Cervical back: Normal range of motion and neck supple. No tenderness.      Right lower leg: No edema.      Left lower leg: No edema.   Lymphadenopathy:      Cervical: No cervical adenopathy.   Skin:     General: Skin is warm and dry.      Coloration: Skin is not jaundiced or pale.   Neurological:      General: No focal deficit present.      Mental Status: She is alert and oriented to person, place, and time. Mental status is at baseline.      Cranial Nerves: No cranial nerve deficit.      Motor: No weakness.      Coordination: Coordination normal.      Gait: Gait normal.   Psychiatric:         Mood and Affect: Mood normal.         Behavior: Behavior normal.         Thought Content: Thought content normal.         Judgment: Judgment normal.       Significant Labs: All pertinent labs within the past 24 hours have been reviewed.    Significant Imaging: I have reviewed all pertinent  imaging results/findings within the past 24 hours.

## 2023-02-03 NOTE — PLAN OF CARE
02/03/23 1013   Discharge Assessment   Assessment Type Discharge Planning Assessment   Confirmed/corrected address, phone number and insurance Yes   Confirmed Demographics Correct on Facesheet   Source of Information patient   Communicated INGA with patient/caregiver Date not available/Unable to determine   Reason For Admission COPD Exacerbation, UTI   People in Home alone   Do you expect to return to your current living situation? Yes   Do you have help at home or someone to help you manage your care at home? No   Prior to hospitilization cognitive status: Alert/Oriented   Current cognitive status: Alert/Oriented   Equipment Currently Used at Home nebulizer  (Has a walker, BSC, Wheelchair, [place] at home but does not use.)   Readmission within 30 days? No   Patient currently being followed by outpatient case management? No   Do you currently have service(s) that help you manage your care at home? No   Do you take prescription medications? Yes   Do you have prescription coverage? Yes   Coverage [employer] and Medicaid   Do you have any problems affording any of your prescribed medications? No   Is the patient taking medications as prescribed? yes   Who is going to help you get home at discharge? Bere Garcia-Daughter  [phone]   How do you get to doctors appointments? car, drives self   Are you on dialysis? No   Do you take coumadin? No   Discharge Plan A Home   Discharge Plan B Home Health   DME Needed Upon Discharge  none   Discharge Plan discussed with: Patient   Discharge Barriers Identified None   Physical Activity   On average, how many days per week do you engage in moderate to strenuous exercise (like a brisk walk)? 3 days   On average, how many minutes do you engage in exercise at this level? 20 min   Financial Resource Strain   How hard is it for you to pay for the very basics like food, housing, medical care, and heating? Very Hard   Housing Stability   In the last 12 months, was there a time  when you were not able to pay the mortgage or rent on time? N   In the last 12 months, how many places have you lived? 1   In the last 12 months, was there a time when you did not have a steady place to sleep or slept in a shelter (including now)? N   Transportation Needs   In the past 12 months, has lack of transportation kept you from medical appointments or from getting medications? no   In the past 12 months, has lack of transportation kept you from meetings, work, or from getting things needed for daily living? No   Food Insecurity   Within the past 12 months, you worried that your food would run out before you got the money to buy more. Never true   Within the past 12 months, the food you bought just didn't last and you didn't have money to get more. Never true   Stress   Do you feel stress - tense, restless, nervous, or anxious, or unable to sleep at night because your mind is troubled all the time - these days? To some exte   Social Connections   In a typical week, how many times do you talk on the phone with family, friends, or neighbors? More than 3   How often do you get together with friends or relatives? Once   How often do you attend Muslim or Mormonism services? More than 4   Do you belong to any clubs or organizations such as Muslim groups, unions, fraternal or athletic groups, or school groups? No   How often do you attend meetings of the clubs or organizations you belong to? Never   Are you , , , , never , or living with a partner?    Alcohol Use   Q1: How often do you have a drink containing alcohol? Never   Q2: How many drinks containing alcohol do you have on a typical day when you are drinking? None   Q3: How often do you have six or more drinks on one occasion? Never

## 2023-02-03 NOTE — HOSPITAL COURSE
February 3, 2023 patient doing better.  Less wheezing.  She is admitting to still smoking 8 cigarettes per day.  Smoking cessation counseling done.  February 4, 2023 patient is doing great only minimal shortness of breath and breathing treatments  February 5, 2023 patient doing well urine culture no growth sputum culture pending.  Tolerating IV steroids and IV antibiotics  February 6, 2023 patient doing well.  Continues to require O2.  Room air pulse ox 82%.  Patient will need home O2  February 7, 2023 patient breathing back to baseline.  She is qualified for home O2 and will be sent home with O2 at 2 liters/minute nasal cannula.  Sputum culture showed normal hill.  To be sent home on p.o. Levaquin and completion of her Medrol Dosepak your she will follow-up with her pulmonologist in 1-2 weeks.  She will follow up with her primary care provider in 1 week.

## 2023-02-03 NOTE — PROGRESS NOTES
"Inpatient Nutrition Evaluation    Admit Date: 2/2/2023   Total duration of encounter: 1 day    Nutrition Recommendation/Prescription     Continue Regular diet and encourage PO intake as needed.     RD to adjust MNT PRN.    Nutrition Assessment     Chart Review    Reason Seen: continuous nutrition monitoring    Malnutrition Screening Tool Results   Have you recently lost weight without trying?: No  Have you been eating poorly because of a decreased appetite?: No   MST Score: 0     Diagnosis:  Acute hypoxic Respiratory Failure d/t COPD exacerbation, Sepsis d/t UTI/COPD/Acute bronchitis, COPD exacerbation with Acute Bronchitis, UTI, HTN, Depression      Diet Order: Diet Adult Regular (IDDSI Level 7)  Oral Supplement Order: none  Appetite/Oral Intake: good/% of meals  Factors Affecting Nutritional Intake: none identified  Food/Muslim/Cultural Preferences: none reported  Food Allergies: no known food allergies       Wound(s):       Comments    February 3, 2023 patient doing better.  Less wheezing.  She is admitting to still smoking 8 cigarettes per day.  Smoking cessation counseling done.    Anthropometrics    Height: 5' 7" (170.2 cm) Height Method: Stated  Last Weight: 66.7 kg (147 lb 1.6 oz) (02/02/23 1600) Weight Method: Bed Scale  BMI (Calculated): 23  BMI Classification: normal (BMI 18.5-24.9)     Ideal Body Weight (IBW), Female: 135 lb     % Ideal Body Weight, Female (lb): 111.11 %                             Usual Weight Provided By: patient denies unintentional weight loss    Wt Readings from Last 3 Encounters:   02/02/23 1600 66.7 kg (147 lb 1.6 oz)   02/02/23 1150 68 kg (150 lb)      Weight Change(s) Since Admission:  Admit Weight: 68 kg (150 lb) (02/02/23 1150)      Patient Education    Not applicable.    Monitoring & Evaluation     Dietitian will monitor energy intake and weight change.  Nutrition Risk/Follow-Up: low (follow-up in 5-7 days)  Patients assigned 'low nutrition risk' status do not " qualify for a full nutritional assessment but will be monitored and re-evaluated in a 5-7 day time period. Please consult if re-evaluation needed sooner.

## 2023-02-04 LAB
ANION GAP SERPL CALC-SCNC: 6 MEQ/L (ref 2–13)
BASOPHILS # BLD AUTO: 0.02 X10(3)/MCL (ref 0.01–0.08)
BASOPHILS NFR BLD AUTO: 0.1 % (ref 0.1–1.2)
BUN SERPL-MCNC: 24 MG/DL (ref 7–20)
CALCIUM SERPL-MCNC: 9.3 MG/DL (ref 8.4–10.2)
CHLORIDE SERPL-SCNC: 105 MMOL/L (ref 98–110)
CO2 SERPL-SCNC: 26 MMOL/L (ref 21–32)
CREAT SERPL-MCNC: 0.79 MG/DL (ref 0.66–1.25)
CREAT/UREA NIT SERPL: 30 (ref 12–20)
EOSINOPHIL # BLD AUTO: 0 X10(3)/MCL (ref 0.04–0.36)
EOSINOPHIL NFR BLD AUTO: 0 % (ref 0.7–7)
ERYTHROCYTE [DISTWIDTH] IN BLOOD BY AUTOMATED COUNT: 12.9 % (ref 11–14.5)
GFR SERPLBLD CREATININE-BSD FMLA CKD-EPI: 81 MLS/MIN/1.73/M2
GLUCOSE SERPL-MCNC: 142 MG/DL (ref 70–115)
HCT VFR BLD AUTO: 39 % (ref 36–48)
HGB BLD-MCNC: 13.7 GM/DL (ref 11.8–16)
IMM GRANULOCYTES # BLD AUTO: 0.13 X10(3)/MCL (ref 0–0.03)
IMM GRANULOCYTES NFR BLD AUTO: 0.8 % (ref 0–0.5)
LYMPHOCYTES # BLD AUTO: 1.4 X10(3)/MCL (ref 1.16–3.74)
LYMPHOCYTES NFR BLD AUTO: 8.8 % (ref 20–55)
MCH RBC QN AUTO: 32.3 PG (ref 27–34)
MCV RBC AUTO: 92 FL (ref 79–99)
MEAN CELL HEMOGLOBIN CONCENTRATION (OHS) G/DL: 35.1 G/DL (ref 31–37)
MONOCYTES # BLD AUTO: 0.72 X10(3)/MCL (ref 0.24–0.36)
MONOCYTES NFR BLD AUTO: 4.5 % (ref 4.7–12.5)
NEUTROPHILS # BLD AUTO: 13.59 X10(3)/MCL (ref 1.56–6.13)
NEUTROPHILS NFR BLD AUTO: 85.8 % (ref 37–73)
NRBC BLD AUTO-RTO: 0 % (ref 0–1)
PLATELET # BLD AUTO: 260 X10(3)/MCL (ref 140–371)
PMV BLD AUTO: 9.6 FL (ref 9.4–12.4)
POTASSIUM SERPL-SCNC: 4.9 MMOL/L (ref 3.5–5.1)
RBC # BLD AUTO: 4.24 X10(6)/MCL (ref 4–5.1)
SODIUM SERPL-SCNC: 137 MMOL/L (ref 135–145)
WBC # SPEC AUTO: 15.9 X10(3)/MCL (ref 4–11.5)

## 2023-02-04 PROCEDURE — 11000001 HC ACUTE MED/SURG PRIVATE ROOM

## 2023-02-04 PROCEDURE — 27000221 HC OXYGEN, UP TO 24 HOURS

## 2023-02-04 PROCEDURE — 99900035 HC TECH TIME PER 15 MIN (STAT)

## 2023-02-04 PROCEDURE — 80048 BASIC METABOLIC PNL TOTAL CA: CPT | Performed by: FAMILY MEDICINE

## 2023-02-04 PROCEDURE — 94640 AIRWAY INHALATION TREATMENT: CPT

## 2023-02-04 PROCEDURE — 63700000 PHARM REV CODE 250 ALT 637 W/O HCPCS: Performed by: INTERNAL MEDICINE

## 2023-02-04 PROCEDURE — 63600175 PHARM REV CODE 636 W HCPCS: Performed by: FAMILY MEDICINE

## 2023-02-04 PROCEDURE — 25000003 PHARM REV CODE 250: Performed by: FAMILY MEDICINE

## 2023-02-04 PROCEDURE — 36415 COLL VENOUS BLD VENIPUNCTURE: CPT | Performed by: FAMILY MEDICINE

## 2023-02-04 PROCEDURE — 94761 N-INVAS EAR/PLS OXIMETRY MLT: CPT

## 2023-02-04 PROCEDURE — 85025 COMPLETE CBC W/AUTO DIFF WBC: CPT | Performed by: FAMILY MEDICINE

## 2023-02-04 PROCEDURE — 94799 UNLISTED PULMONARY SVC/PX: CPT

## 2023-02-04 PROCEDURE — 25000003 PHARM REV CODE 250: Performed by: INTERNAL MEDICINE

## 2023-02-04 PROCEDURE — 25000242 PHARM REV CODE 250 ALT 637 W/ HCPCS: Performed by: FAMILY MEDICINE

## 2023-02-04 RX ADMIN — ALBUTEROL SULFATE 2.5 MG: 2.5 SOLUTION RESPIRATORY (INHALATION) at 03:02

## 2023-02-04 RX ADMIN — AZITHROMYCIN MONOHYDRATE 250 MG: 250 TABLET ORAL at 09:02

## 2023-02-04 RX ADMIN — GUAIFENESIN 600 MG: 600 TABLET, EXTENDED RELEASE ORAL at 09:02

## 2023-02-04 RX ADMIN — METHYLPREDNISOLONE SODIUM SUCCINATE 80 MG: 125 INJECTION, POWDER, FOR SOLUTION INTRAMUSCULAR; INTRAVENOUS at 05:02

## 2023-02-04 RX ADMIN — METHYLPREDNISOLONE SODIUM SUCCINATE 80 MG: 125 INJECTION, POWDER, FOR SOLUTION INTRAMUSCULAR; INTRAVENOUS at 02:02

## 2023-02-04 RX ADMIN — METHYLPREDNISOLONE SODIUM SUCCINATE 80 MG: 125 INJECTION, POWDER, FOR SOLUTION INTRAMUSCULAR; INTRAVENOUS at 09:02

## 2023-02-04 RX ADMIN — ALBUTEROL SULFATE 2.5 MG: 2.5 SOLUTION RESPIRATORY (INHALATION) at 07:02

## 2023-02-04 RX ADMIN — BUDESONIDE INHALATION 0.5 MG: 0.5 SUSPENSION RESPIRATORY (INHALATION) at 07:02

## 2023-02-04 RX ADMIN — BUDESONIDE INHALATION 0.5 MG: 0.5 SUSPENSION RESPIRATORY (INHALATION) at 08:02

## 2023-02-04 RX ADMIN — ACETAMINOPHEN 650 MG: 325 TABLET, FILM COATED ORAL at 05:02

## 2023-02-04 RX ADMIN — CEFTRIAXONE SODIUM 1 G: 1 INJECTION, POWDER, FOR SOLUTION INTRAMUSCULAR; INTRAVENOUS at 09:02

## 2023-02-04 RX ADMIN — ENOXAPARIN SODIUM 40 MG: 40 INJECTION SUBCUTANEOUS at 05:02

## 2023-02-04 RX ADMIN — LISINOPRIL 5 MG: 5 TABLET ORAL at 09:02

## 2023-02-04 RX ADMIN — PAROXETINE 10 MG: 10 TABLET, FILM COATED ORAL at 09:02

## 2023-02-04 RX ADMIN — ACETAMINOPHEN 650 MG: 325 TABLET, FILM COATED ORAL at 09:02

## 2023-02-04 RX ADMIN — ALBUTEROL SULFATE 2.5 MG: 2.5 SOLUTION RESPIRATORY (INHALATION) at 11:02

## 2023-02-04 NOTE — SUBJECTIVE & OBJECTIVE
Interval History:     Review of Systems  Objective:     Vital Signs (Most Recent):  Temp: 97.2 °F (36.2 °C) (02/04/23 0947)  Pulse: 78 (02/04/23 1130)  Resp: 20 (02/04/23 1130)  BP: (!) 136/55 (02/04/23 0947)  SpO2: (!) 94 % (02/04/23 1130)   Vital Signs (24h Range):  Temp:  [97.2 °F (36.2 °C)-98.1 °F (36.7 °C)] 97.2 °F (36.2 °C)  Pulse:  [63-82] 78  Resp:  [20] 20  SpO2:  [89 %-95 %] 94 %  BP: (133-165)/(55-72) 136/55     Weight: 66.7 kg (147 lb)  Body mass index is 23.02 kg/m².    Intake/Output Summary (Last 24 hours) at 2/4/2023 1215  Last data filed at 2/4/2023 0931  Gross per 24 hour   Intake 290 ml   Output --   Net 290 ml      Physical Exam    Significant Labs: All pertinent labs within the past 24 hours have been reviewed.    Significant Imaging: I have reviewed all pertinent imaging results/findings within the past 24 hours.

## 2023-02-04 NOTE — ASSESSMENT & PLAN NOTE
Low-salt diet.  Continue current antihypertensive medications.  Continue to monitor blood pressure.

## 2023-02-04 NOTE — PROGRESS NOTES
[facility]/Surg  [facility] Medicine  Progress Note    Patient Name: Shira Morales  MRN: [id number]  Patient Class: IP- Inpatient   Admission Date: 2/2/2023  Length of Stay: 1 days  Attending Physician: Anne Marie Iyer MD  Primary Care Provider: Primary Doctor No        Subjective:     Principal Problem:COPD exacerbation        HPI:  73 yo female with hx of COPD continues to smoke, HTN, Depression presents with 3 day hx of productive cough yellow green, fever, urinary symptoms. Stating she has been coughing SOB, wheezing, No N/V/D.      Pt is Full code SDM is her daughter   Pt is see and examined via Telemed. Pt is in [facility]. I am in [place], [place]. Nursing staff assisted with evaluation. Software is Audio/ Video  Pt is being admitted as an observation for further evaluation and treatment      Overview/Hospital Course:  February 3, 2023 patient doing better.  Less wheezing.  She is admitting to still smoking 8 cigarettes per day.  Smoking cessation counseling done.  February 4, 2023 patient is doing great only minimal shortness of breath tolerating steroids and antibiotics and breathing treatments      Interval History:     Review of Systems  Objective:     Vital Signs (Most Recent):  Temp: 97.2 °F (36.2 °C) (02/04/23 0947)  Pulse: 78 (02/04/23 1130)  Resp: 20 (02/04/23 1130)  BP: (!) 136/55 (02/04/23 0947)  SpO2: (!) 94 % (02/04/23 1130)   Vital Signs (24h Range):  Temp:  [97.2 °F (36.2 °C)-98.1 °F (36.7 °C)] 97.2 °F (36.2 °C)  Pulse:  [63-82] 78  Resp:  [20] 20  SpO2:  [89 %-95 %] 94 %  BP: (133-165)/(55-72) 136/55     Weight: 66.7 kg (147 lb)  Body mass index is 23.02 kg/m².    Intake/Output Summary (Last 24 hours) at 2/4/2023 1215  Last data filed at 2/4/2023 0931  Gross per 24 hour   Intake 290 ml   Output --   Net 290 ml      Physical Exam    Significant Labs: All pertinent labs within the past 24 hours have been reviewed.    Significant Imaging: I have reviewed all pertinent imaging  results/findings within the past 24 hours.      Assessment/Plan:      * COPD exacerbation  Change steroids to p.o.    Depression  Patient has persistent depression which is moderate and is currently controlled. Will Continue anti-depressant medications. We will not consult psychiatry at this time. Patient does not display psychosis at this time. Continue to monitor closely and adjust plan of care as needed.        HTN (hypertension)  Low-salt diet.  Continue current antihypertensive medications.  Continue to monitor blood pressure.      VTE Risk Mitigation (From admission, onward)         Ordered     enoxaparin injection 40 mg  Daily         02/02/23 1642     IP VTE HIGH RISK PATIENT  Once         02/02/23 1642                Discharge Planning   INGA:      Code Status: Full Code   Is the patient medically ready for discharge?:     Reason for patient still in hospital (select all that apply): Patient unstable  Discharge Plan A: Home                  Georgi Jackson MD  Department of Hospital Medicine   [facility]/Surg

## 2023-02-05 LAB
ANION GAP SERPL CALC-SCNC: 5 MEQ/L (ref 2–13)
BACTERIA UR CULT: NORMAL
BASOPHILS # BLD AUTO: 0.01 X10(3)/MCL (ref 0.01–0.08)
BASOPHILS NFR BLD AUTO: 0.1 % (ref 0.1–1.2)
BUN SERPL-MCNC: 26 MG/DL (ref 7–20)
CALCIUM SERPL-MCNC: 8.6 MG/DL (ref 8.4–10.2)
CHLORIDE SERPL-SCNC: 105 MMOL/L (ref 98–110)
CO2 SERPL-SCNC: 28 MMOL/L (ref 21–32)
CREAT SERPL-MCNC: 0.87 MG/DL (ref 0.66–1.25)
CREAT/UREA NIT SERPL: 30 (ref 12–20)
EOSINOPHIL # BLD AUTO: 0 X10(3)/MCL (ref 0.04–0.36)
EOSINOPHIL NFR BLD AUTO: 0 % (ref 0.7–7)
ERYTHROCYTE [DISTWIDTH] IN BLOOD BY AUTOMATED COUNT: 13.1 % (ref 11–14.5)
GFR SERPLBLD CREATININE-BSD FMLA CKD-EPI: 72 MLS/MIN/1.73/M2
GLUCOSE SERPL-MCNC: 123 MG/DL (ref 70–115)
HCT VFR BLD AUTO: 38.7 % (ref 36–48)
HGB BLD-MCNC: 13.3 GM/DL (ref 11.8–16)
IMM GRANULOCYTES # BLD AUTO: 0.09 X10(3)/MCL (ref 0–0.03)
IMM GRANULOCYTES NFR BLD AUTO: 0.6 % (ref 0–0.5)
LYMPHOCYTES # BLD AUTO: 1.54 X10(3)/MCL (ref 1.16–3.74)
LYMPHOCYTES NFR BLD AUTO: 10.8 % (ref 20–55)
MCH RBC QN AUTO: 32.3 PG (ref 27–34)
MCV RBC AUTO: 93.9 FL (ref 79–99)
MEAN CELL HEMOGLOBIN CONCENTRATION (OHS) G/DL: 34.4 G/DL (ref 31–37)
MONOCYTES # BLD AUTO: 0.65 X10(3)/MCL (ref 0.24–0.36)
MONOCYTES NFR BLD AUTO: 4.6 % (ref 4.7–12.5)
NEUTROPHILS # BLD AUTO: 11.91 X10(3)/MCL (ref 1.56–6.13)
NEUTROPHILS NFR BLD AUTO: 83.9 % (ref 37–73)
NRBC BLD AUTO-RTO: 0 % (ref 0–1)
PLATELET # BLD AUTO: 288 X10(3)/MCL (ref 140–371)
PMV BLD AUTO: 9.6 FL (ref 9.4–12.4)
POTASSIUM SERPL-SCNC: 4.8 MMOL/L (ref 3.5–5.1)
RBC # BLD AUTO: 4.12 X10(6)/MCL (ref 4–5.1)
SODIUM SERPL-SCNC: 138 MMOL/L (ref 135–145)
WBC # SPEC AUTO: 14.2 X10(3)/MCL (ref 4–11.5)

## 2023-02-05 PROCEDURE — 11000001 HC ACUTE MED/SURG PRIVATE ROOM

## 2023-02-05 PROCEDURE — 94761 N-INVAS EAR/PLS OXIMETRY MLT: CPT

## 2023-02-05 PROCEDURE — 25000003 PHARM REV CODE 250: Performed by: FAMILY MEDICINE

## 2023-02-05 PROCEDURE — 63700000 PHARM REV CODE 250 ALT 637 W/O HCPCS: Performed by: FAMILY MEDICINE

## 2023-02-05 PROCEDURE — 63600175 PHARM REV CODE 636 W HCPCS: Performed by: FAMILY MEDICINE

## 2023-02-05 PROCEDURE — 80048 BASIC METABOLIC PNL TOTAL CA: CPT | Performed by: FAMILY MEDICINE

## 2023-02-05 PROCEDURE — 25000003 PHARM REV CODE 250: Performed by: INTERNAL MEDICINE

## 2023-02-05 PROCEDURE — 63600175 PHARM REV CODE 636 W HCPCS: Performed by: INTERNAL MEDICINE

## 2023-02-05 PROCEDURE — 94640 AIRWAY INHALATION TREATMENT: CPT

## 2023-02-05 PROCEDURE — 25000242 PHARM REV CODE 250 ALT 637 W/ HCPCS: Performed by: FAMILY MEDICINE

## 2023-02-05 PROCEDURE — 27000221 HC OXYGEN, UP TO 24 HOURS

## 2023-02-05 PROCEDURE — 99900035 HC TECH TIME PER 15 MIN (STAT)

## 2023-02-05 PROCEDURE — 36415 COLL VENOUS BLD VENIPUNCTURE: CPT | Performed by: FAMILY MEDICINE

## 2023-02-05 PROCEDURE — 85025 COMPLETE CBC W/AUTO DIFF WBC: CPT | Performed by: FAMILY MEDICINE

## 2023-02-05 RX ORDER — METHYLPREDNISOLONE 4 MG/1
4 TABLET ORAL ONCE
Status: DISCONTINUED | OUTPATIENT
Start: 2023-02-08 | End: 2023-02-07 | Stop reason: HOSPADM

## 2023-02-05 RX ORDER — METHYLPREDNISOLONE 4 MG/1
4 TABLET ORAL ONCE
Status: DISCONTINUED | OUTPATIENT
Start: 2023-02-09 | End: 2023-02-07 | Stop reason: HOSPADM

## 2023-02-05 RX ORDER — METHYLPREDNISOLONE 4 MG/1
4 TABLET ORAL ONCE
Status: DISCONTINUED | OUTPATIENT
Start: 2023-02-10 | End: 2023-02-07 | Stop reason: HOSPADM

## 2023-02-05 RX ORDER — METHYLPREDNISOLONE 4 MG/1
8 TABLET ORAL ONCE
Status: COMPLETED | OUTPATIENT
Start: 2023-02-05 | End: 2023-02-05

## 2023-02-05 RX ORDER — METHYLPREDNISOLONE 4 MG/1
4 TABLET ORAL ONCE
Status: COMPLETED | OUTPATIENT
Start: 2023-02-07 | End: 2023-02-07

## 2023-02-05 RX ORDER — METHYLPREDNISOLONE 4 MG/1
4 TABLET ORAL ONCE
Status: COMPLETED | OUTPATIENT
Start: 2023-02-06 | End: 2023-02-06

## 2023-02-05 RX ORDER — METHYLPREDNISOLONE 4 MG/1
4 TABLET ORAL ONCE
Status: DISCONTINUED | OUTPATIENT
Start: 2023-02-07 | End: 2023-02-07 | Stop reason: HOSPADM

## 2023-02-05 RX ORDER — METHYLPREDNISOLONE 4 MG/1
8 TABLET ORAL ONCE
Status: COMPLETED | OUTPATIENT
Start: 2023-02-06 | End: 2023-02-06

## 2023-02-05 RX ORDER — BUDESONIDE 0.5 MG/2ML
0.5 INHALANT ORAL 2 TIMES DAILY
Status: DISCONTINUED | OUTPATIENT
Start: 2023-02-05 | End: 2023-02-07 | Stop reason: HOSPADM

## 2023-02-05 RX ORDER — METHYLPREDNISOLONE 4 MG/1
4 TABLET ORAL ONCE
Status: COMPLETED | OUTPATIENT
Start: 2023-02-05 | End: 2023-02-05

## 2023-02-05 RX ADMIN — METHYLPREDNISOLONE 4 MG: 4 TABLET ORAL at 01:02

## 2023-02-05 RX ADMIN — BUDESONIDE INHALATION 0.5 MG: 0.5 SUSPENSION RESPIRATORY (INHALATION) at 07:02

## 2023-02-05 RX ADMIN — METHYLPREDNISOLONE 8 MG: 4 TABLET ORAL at 12:02

## 2023-02-05 RX ADMIN — ALBUTEROL SULFATE 2.5 MG: 2.5 SOLUTION RESPIRATORY (INHALATION) at 08:02

## 2023-02-05 RX ADMIN — CEFTRIAXONE SODIUM 1 G: 1 INJECTION, POWDER, FOR SOLUTION INTRAMUSCULAR; INTRAVENOUS at 08:02

## 2023-02-05 RX ADMIN — AZITHROMYCIN MONOHYDRATE 250 MG: 250 TABLET ORAL at 08:02

## 2023-02-05 RX ADMIN — BUDESONIDE INHALATION 0.5 MG: 0.5 SUSPENSION RESPIRATORY (INHALATION) at 08:02

## 2023-02-05 RX ADMIN — GUAIFENESIN 600 MG: 600 TABLET, EXTENDED RELEASE ORAL at 08:02

## 2023-02-05 RX ADMIN — ALBUTEROL SULFATE 2.5 MG: 2.5 SOLUTION RESPIRATORY (INHALATION) at 07:02

## 2023-02-05 RX ADMIN — PAROXETINE 10 MG: 10 TABLET, FILM COATED ORAL at 08:02

## 2023-02-05 RX ADMIN — ALBUTEROL SULFATE 2.5 MG: 2.5 SOLUTION RESPIRATORY (INHALATION) at 03:02

## 2023-02-05 RX ADMIN — ALBUTEROL SULFATE 2.5 MG: 2.5 SOLUTION RESPIRATORY (INHALATION) at 11:02

## 2023-02-05 RX ADMIN — METHYLPREDNISOLONE 8 MG: 4 TABLET ORAL at 08:02

## 2023-02-05 RX ADMIN — LISINOPRIL 5 MG: 5 TABLET ORAL at 08:02

## 2023-02-05 RX ADMIN — METHYLPREDNISOLONE 4 MG: 4 TABLET ORAL at 05:02

## 2023-02-05 RX ADMIN — METHYLPREDNISOLONE SODIUM SUCCINATE 80 MG: 125 INJECTION, POWDER, FOR SOLUTION INTRAMUSCULAR; INTRAVENOUS at 05:02

## 2023-02-05 RX ADMIN — ENOXAPARIN SODIUM 40 MG: 40 INJECTION SUBCUTANEOUS at 05:02

## 2023-02-05 NOTE — SUBJECTIVE & OBJECTIVE
Interval History:     Review of Systems   Constitutional:  Negative for activity change, appetite change, chills, diaphoresis, fatigue and fever.   HENT:  Negative for congestion, ear pain, rhinorrhea and sore throat.    Eyes:  Negative for pain, discharge and redness.   Respiratory:  Positive for shortness of breath and wheezing. Negative for apnea, cough, choking and chest tightness.    Cardiovascular:  Negative for chest pain, palpitations and leg swelling.   Gastrointestinal:  Negative for abdominal distention, abdominal pain, blood in stool, constipation, diarrhea, nausea and vomiting.   Endocrine: Negative for cold intolerance, heat intolerance and polyuria.   Genitourinary:  Negative for difficulty urinating, dysuria, frequency, hematuria and pelvic pain.   Musculoskeletal:  Negative for arthralgias, back pain, gait problem, joint swelling and myalgias.   Skin:  Negative for color change, pallor, rash and wound.   Neurological:  Negative for seizures, syncope, speech difficulty, numbness and headaches.   Psychiatric/Behavioral:  Negative for agitation, confusion, hallucinations and sleep disturbance. The patient is not nervous/anxious.    Objective:     Vital Signs (Most Recent):  Temp: 97.4 °F (36.3 °C) (02/05/23 0925)  Pulse: 73 (02/05/23 0925)  Resp: 20 (02/05/23 0925)  BP: (!) 175/91 (02/05/23 0925)  SpO2: (!) 93 % (02/05/23 1043)   Vital Signs (24h Range):  Temp:  [97.1 °F (36.2 °C)-97.8 °F (36.6 °C)] 97.4 °F (36.3 °C)  Pulse:  [62-78] 73  Resp:  [16-22] 20  SpO2:  [93 %-97 %] 93 %  BP: (138-175)/(61-91) 175/91     Weight: 66.7 kg (147 lb)  Body mass index is 23.02 kg/m².  No intake or output data in the 24 hours ending 02/05/23 1051     Physical Exam  Constitutional:       General: She is not in acute distress.     Appearance: Normal appearance. She is not ill-appearing, toxic-appearing or diaphoretic.   HENT:      Head: Normocephalic and atraumatic.      Right Ear: External ear normal.      Left Ear:  External ear normal.      Nose: Nose normal. No congestion or rhinorrhea.      Mouth/Throat:      Mouth: Mucous membranes are moist.      Pharynx: Oropharynx is clear.   Eyes:      Extraocular Movements: Extraocular movements intact.      Conjunctiva/sclera: Conjunctivae normal.      Pupils: Pupils are equal, round, and reactive to light.   Neck:      Vascular: No carotid bruit.   Cardiovascular:      Rate and Rhythm: Normal rate and regular rhythm.      Pulses: Normal pulses.      Heart sounds: Normal heart sounds. No murmur heard.  Pulmonary:      Effort: Pulmonary effort is normal. No respiratory distress.      Breath sounds: Wheezing and rhonchi present. No rales.   Abdominal:      General: Abdomen is flat. Bowel sounds are normal. There is no distension.      Palpations: Abdomen is soft.      Tenderness: There is no abdominal tenderness. There is no guarding.   Musculoskeletal:         General: No swelling or tenderness. Normal range of motion.      Cervical back: Normal range of motion and neck supple. No tenderness.      Right lower leg: No edema.      Left lower leg: No edema.   Lymphadenopathy:      Cervical: No cervical adenopathy.   Skin:     General: Skin is warm and dry.      Coloration: Skin is not jaundiced or pale.   Neurological:      General: No focal deficit present.      Mental Status: She is alert and oriented to person, place, and time. Mental status is at baseline.      Cranial Nerves: No cranial nerve deficit.      Motor: No weakness.      Coordination: Coordination normal.      Gait: Gait normal.   Psychiatric:         Mood and Affect: Mood normal.         Behavior: Behavior normal.         Thought Content: Thought content normal.         Judgment: Judgment normal.       Significant Labs: All pertinent labs within the past 24 hours have been reviewed.    Significant Imaging: I have reviewed all pertinent imaging results/findings within the past 24 hours.

## 2023-02-05 NOTE — PLAN OF CARE
Problem: Adult Inpatient Plan of Care  Goal: Plan of Care Review  2/5/2023 0344 by Elyssa Eldridge RN  Outcome: Ongoing, Progressing  2/5/2023 0342 by Elyssa Eldridge RN  Outcome: Ongoing, Progressing  Goal: Patient-Specific Goal (Individualized)  2/5/2023 0344 by Elyssa Eldridge RN  Outcome: Ongoing, Progressing  2/5/2023 0342 by Elyssa Eldridge RN  Outcome: Ongoing, Progressing  Goal: Absence of Hospital-Acquired Illness or Injury  2/5/2023 0344 by Elyssa Eldridge RN  Outcome: Ongoing, Progressing  2/5/2023 0342 by Elyssa Eldridge RN  Outcome: Ongoing, Progressing  Goal: Optimal Comfort and Wellbeing  2/5/2023 0344 by Elyssa Eldridge RN  Outcome: Ongoing, Progressing  2/5/2023 0342 by Elyssa Eldridge RN  Outcome: Ongoing, Progressing  Goal: Readiness for Transition of Care  2/5/2023 0344 by Elyssa Eldridge RN  Outcome: Ongoing, Progressing  2/5/2023 0342 by Elyssa Eldridge RN  Outcome: Ongoing, Progressing     Problem: Fall Injury Risk  Goal: Absence of Fall and Fall-Related Injury  2/5/2023 0344 by Elyssa Eldridge RN  Outcome: Ongoing, Progressing  2/5/2023 0342 by Elyssa Eldridge RN  Outcome: Ongoing, Progressing     Problem: Adjustment to Illness COPD (Chronic Obstructive Pulmonary Disease)  Goal: Optimal Chronic Illness Coping  Outcome: Ongoing, Progressing     Problem: Functional Ability Impaired COPD (Chronic Obstructive Pulmonary Disease)  Goal: Optimal Level of Functional Cisco  Outcome: Ongoing, Progressing     Problem: Infection COPD (Chronic Obstructive Pulmonary Disease)  Goal: Absence of Infection Signs and Symptoms  Outcome: Ongoing, Progressing     Problem: Oral Intake Inadequate COPD (Chronic Obstructive Pulmonary Disease)  Goal: Improved Nutrition Intake  Outcome: Ongoing, Progressing     Problem: Respiratory Compromise COPD (Chronic Obstructive Pulmonary Disease)  Goal: Effective Oxygenation and Ventilation  Outcome: Ongoing, Progressing     Problem: Infection  Goal: Absence of Infection Signs and Symptoms  Outcome: Ongoing,  Progressing

## 2023-02-05 NOTE — ASSESSMENT & PLAN NOTE
ANDREW Solu-Medrol and start Medrol Dosepak    Follow-up sputum culture with possible discharge home tomorrow on p.o. antibiotics

## 2023-02-05 NOTE — PROGRESS NOTES
[facility]  Progress Note    Patient Name: Shira Morales  MRN: [id number]  Patient Class: IP- Inpatient   Admission Date: 2/2/2023  Length of Stay: 2 days  Attending Physician: Anne Marie Iyer MD  Primary Care Provider: Primary Doctor No        Subjective:     Principal Problem:COPD exacerbation        HPI:  73 yo female with hx of COPD continues to smoke, HTN, Depression presents with 3 day hx of productive cough yellow green, fever, urinary symptoms. Stating she has been coughing SOB, wheezing, No N/V/D.      Pt is Full code SDM is her daughter   Pt is see and examined via Telemed. Pt is in [facility]. I am in [place], [place]. Nursing staff assisted with evaluation. Software is Audio/ Video  Pt is being admitted as an observation for further evaluation and treatment      Overview/Hospital Course:  February 3, 2023 patient doing better.  Less wheezing.  She is admitting to still smoking 8 cigarettes per day.  Smoking cessation counseling done.  February 4, 2023 patient is doing great only minimal shortness of breath and breathing treatments  February 5, 2023 patient doing well urine culture no growth sputum culture pending.  Tolerating IV steroids and IV antibiotics      Interval History:     Review of Systems   Constitutional:  Negative for activity change, appetite change, chills, diaphoresis, fatigue and fever.   HENT:  Negative for congestion, ear pain, rhinorrhea and sore throat.    Eyes:  Negative for pain, discharge and redness.   Respiratory:  Positive for shortness of breath and wheezing. Negative for apnea, cough, choking and chest tightness.    Cardiovascular:  Negative for chest pain, palpitations and leg swelling.   Gastrointestinal:  Negative for abdominal distention, abdominal pain, blood in stool, constipation, diarrhea, nausea and vomiting.   Endocrine: Negative for cold intolerance, heat intolerance and polyuria.   Genitourinary:  Negative for difficulty  urinating, dysuria, frequency, hematuria and pelvic pain.   Musculoskeletal:  Negative for arthralgias, back pain, gait problem, joint swelling and myalgias.   Skin:  Negative for color change, pallor, rash and wound.   Neurological:  Negative for seizures, syncope, speech difficulty, numbness and headaches.   Psychiatric/Behavioral:  Negative for agitation, confusion, hallucinations and sleep disturbance. The patient is not nervous/anxious.    Objective:     Vital Signs (Most Recent):  Temp: 97.4 °F (36.3 °C) (02/05/23 0925)  Pulse: 73 (02/05/23 0925)  Resp: 20 (02/05/23 0925)  BP: (!) 175/91 (02/05/23 0925)  SpO2: (!) 93 % (02/05/23 1043)   Vital Signs (24h Range):  Temp:  [97.1 °F (36.2 °C)-97.8 °F (36.6 °C)] 97.4 °F (36.3 °C)  Pulse:  [62-78] 73  Resp:  [16-22] 20  SpO2:  [93 %-97 %] 93 %  BP: (138-175)/(61-91) 175/91     Weight: 66.7 kg (147 lb)  Body mass index is 23.02 kg/m².  No intake or output data in the 24 hours ending 02/05/23 1051     Physical Exam  Constitutional:       General: She is not in acute distress.     Appearance: Normal appearance. She is not ill-appearing, toxic-appearing or diaphoretic.   HENT:      Head: Normocephalic and atraumatic.      Right Ear: External ear normal.      Left Ear: External ear normal.      Nose: Nose normal. No congestion or rhinorrhea.      Mouth/Throat:      Mouth: Mucous membranes are moist.      Pharynx: Oropharynx is clear.   Eyes:      Extraocular Movements: Extraocular movements intact.      Conjunctiva/sclera: Conjunctivae normal.      Pupils: Pupils are equal, round, and reactive to light.   Neck:      Vascular: No carotid bruit.   Cardiovascular:      Rate and Rhythm: Normal rate and regular rhythm.      Pulses: Normal pulses.      Heart sounds: Normal heart sounds. No murmur heard.  Pulmonary:      Effort: Pulmonary effort is normal. No respiratory distress.      Breath sounds: Wheezing and rhonchi present. No rales.   Abdominal:      General: Abdomen is  flat. Bowel sounds are normal. There is no distension.      Palpations: Abdomen is soft.      Tenderness: There is no abdominal tenderness. There is no guarding.   Musculoskeletal:         General: No swelling or tenderness. Normal range of motion.      Cervical back: Normal range of motion and neck supple. No tenderness.      Right lower leg: No edema.      Left lower leg: No edema.   Lymphadenopathy:      Cervical: No cervical adenopathy.   Skin:     General: Skin is warm and dry.      Coloration: Skin is not jaundiced or pale.   Neurological:      General: No focal deficit present.      Mental Status: She is alert and oriented to person, place, and time. Mental status is at baseline.      Cranial Nerves: No cranial nerve deficit.      Motor: No weakness.      Coordination: Coordination normal.      Gait: Gait normal.   Psychiatric:         Mood and Affect: Mood normal.         Behavior: Behavior normal.         Thought Content: Thought content normal.         Judgment: Judgment normal.       Significant Labs: All pertinent labs within the past 24 hours have been reviewed.    Significant Imaging: I have reviewed all pertinent imaging results/findings within the past 24 hours.      Assessment/Plan:      * COPD exacerbation  DC Solu-Medrol and start Medrol Dosepak    Follow-up sputum culture with possible discharge home tomorrow on p.o. antibiotics    Depression  Patient has persistent depression which is moderate and is currently controlled. Will Continue anti-depressant medications. We will not consult psychiatry at this time. Patient does not display psychosis at this time. Continue to monitor closely and adjust plan of care as needed.        HTN (hypertension)  Low-salt diet.  Continue current antihypertensive medications.  Continue to monitor blood pressure.        VTE Risk Mitigation (From admission, onward)         Ordered     enoxaparin injection 40 mg  Daily         02/02/23 1642     IP VTE HIGH RISK  PATIENT  Once         02/02/23 1642                Discharge Planning   INGA:      Code Status: Full Code   Is the patient medically ready for discharge?:     Reason for patient still in hospital (select all that apply): Patient unstable  Discharge Plan A: Home                  Georgi Jackson MD  Department of Hospital Medicine   [facility]

## 2023-02-06 LAB
ANION GAP SERPL CALC-SCNC: 2 MEQ/L (ref 2–13)
BASOPHILS # BLD AUTO: 0.02 X10(3)/MCL (ref 0.01–0.08)
BASOPHILS NFR BLD AUTO: 0.2 % (ref 0.1–1.2)
BUN SERPL-MCNC: 34 MG/DL (ref 7–20)
CALCIUM SERPL-MCNC: 9 MG/DL (ref 8.4–10.2)
CHLORIDE SERPL-SCNC: 102 MMOL/L (ref 98–110)
CO2 SERPL-SCNC: 33 MMOL/L (ref 21–32)
CREAT SERPL-MCNC: 0.88 MG/DL (ref 0.66–1.25)
CREAT/UREA NIT SERPL: 39 (ref 12–20)
EOSINOPHIL # BLD AUTO: 0 X10(3)/MCL (ref 0.04–0.36)
EOSINOPHIL NFR BLD AUTO: 0 % (ref 0.7–7)
ERYTHROCYTE [DISTWIDTH] IN BLOOD BY AUTOMATED COUNT: 12.9 % (ref 11–14.5)
GFR SERPLBLD CREATININE-BSD FMLA CKD-EPI: 71 MLS/MIN/1.73/M2
GLUCOSE SERPL-MCNC: 101 MG/DL (ref 70–115)
HCT VFR BLD AUTO: 38.8 % (ref 36–48)
HGB BLD-MCNC: 13.5 GM/DL (ref 11.8–16)
IMM GRANULOCYTES # BLD AUTO: 0.15 X10(3)/MCL (ref 0–0.03)
IMM GRANULOCYTES NFR BLD AUTO: 1.2 % (ref 0–0.5)
LYMPHOCYTES # BLD AUTO: 2.66 X10(3)/MCL (ref 1.16–3.74)
LYMPHOCYTES NFR BLD AUTO: 20.8 % (ref 20–55)
MCH RBC QN AUTO: 32.4 PG (ref 27–34)
MCV RBC AUTO: 93 FL (ref 79–99)
MEAN CELL HEMOGLOBIN CONCENTRATION (OHS) G/DL: 34.8 G/DL (ref 31–37)
MONOCYTES # BLD AUTO: 1.33 X10(3)/MCL (ref 0.24–0.36)
MONOCYTES NFR BLD AUTO: 10.4 % (ref 4.7–12.5)
NEUTROPHILS # BLD AUTO: 8.64 X10(3)/MCL (ref 1.56–6.13)
NEUTROPHILS NFR BLD AUTO: 67.4 % (ref 37–73)
NRBC BLD AUTO-RTO: 0 % (ref 0–1)
PLATELET # BLD AUTO: 277 X10(3)/MCL (ref 140–371)
PMV BLD AUTO: 9.2 FL (ref 9.4–12.4)
POTASSIUM SERPL-SCNC: 4.7 MMOL/L (ref 3.5–5.1)
RBC # BLD AUTO: 4.17 X10(6)/MCL (ref 4–5.1)
SODIUM SERPL-SCNC: 137 MMOL/L (ref 135–145)
WBC # SPEC AUTO: 12.8 X10(3)/MCL (ref 4–11.5)

## 2023-02-06 PROCEDURE — 11000001 HC ACUTE MED/SURG PRIVATE ROOM

## 2023-02-06 PROCEDURE — 85025 COMPLETE CBC W/AUTO DIFF WBC: CPT | Performed by: FAMILY MEDICINE

## 2023-02-06 PROCEDURE — 63600175 PHARM REV CODE 636 W HCPCS: Performed by: FAMILY MEDICINE

## 2023-02-06 PROCEDURE — 25000242 PHARM REV CODE 250 ALT 637 W/ HCPCS: Performed by: FAMILY MEDICINE

## 2023-02-06 PROCEDURE — 94761 N-INVAS EAR/PLS OXIMETRY MLT: CPT

## 2023-02-06 PROCEDURE — 63700000 PHARM REV CODE 250 ALT 637 W/O HCPCS: Performed by: FAMILY MEDICINE

## 2023-02-06 PROCEDURE — 63600175 PHARM REV CODE 636 W HCPCS: Performed by: INTERNAL MEDICINE

## 2023-02-06 PROCEDURE — 27000221 HC OXYGEN, UP TO 24 HOURS

## 2023-02-06 PROCEDURE — 94640 AIRWAY INHALATION TREATMENT: CPT

## 2023-02-06 PROCEDURE — 25000003 PHARM REV CODE 250: Performed by: FAMILY MEDICINE

## 2023-02-06 PROCEDURE — 36415 COLL VENOUS BLD VENIPUNCTURE: CPT | Performed by: FAMILY MEDICINE

## 2023-02-06 PROCEDURE — 25000003 PHARM REV CODE 250: Performed by: INTERNAL MEDICINE

## 2023-02-06 PROCEDURE — 80048 BASIC METABOLIC PNL TOTAL CA: CPT | Performed by: FAMILY MEDICINE

## 2023-02-06 PROCEDURE — 99900035 HC TECH TIME PER 15 MIN (STAT)

## 2023-02-06 RX ORDER — LISINOPRIL 10 MG/1
10 TABLET ORAL DAILY
Status: DISCONTINUED | OUTPATIENT
Start: 2023-02-07 | End: 2023-02-07 | Stop reason: HOSPADM

## 2023-02-06 RX ORDER — LISINOPRIL 5 MG/1
5 TABLET ORAL ONCE
Status: DISCONTINUED | OUTPATIENT
Start: 2023-02-06 | End: 2023-02-07 | Stop reason: HOSPADM

## 2023-02-06 RX ADMIN — ALBUTEROL SULFATE 2.5 MG: 2.5 SOLUTION RESPIRATORY (INHALATION) at 08:02

## 2023-02-06 RX ADMIN — AZITHROMYCIN MONOHYDRATE 250 MG: 250 TABLET ORAL at 09:02

## 2023-02-06 RX ADMIN — ENOXAPARIN SODIUM 40 MG: 40 INJECTION SUBCUTANEOUS at 05:02

## 2023-02-06 RX ADMIN — BUDESONIDE INHALATION 0.5 MG: 0.5 SUSPENSION RESPIRATORY (INHALATION) at 07:02

## 2023-02-06 RX ADMIN — ACETAMINOPHEN 650 MG: 325 TABLET, FILM COATED ORAL at 09:02

## 2023-02-06 RX ADMIN — GUAIFENESIN 600 MG: 600 TABLET, EXTENDED RELEASE ORAL at 09:02

## 2023-02-06 RX ADMIN — METHYLPREDNISOLONE 8 MG: 4 TABLET ORAL at 09:02

## 2023-02-06 RX ADMIN — ALBUTEROL SULFATE 2.5 MG: 2.5 SOLUTION RESPIRATORY (INHALATION) at 07:02

## 2023-02-06 RX ADMIN — BUDESONIDE INHALATION 0.5 MG: 0.5 SUSPENSION RESPIRATORY (INHALATION) at 08:02

## 2023-02-06 RX ADMIN — PAROXETINE 10 MG: 10 TABLET, FILM COATED ORAL at 09:02

## 2023-02-06 RX ADMIN — LISINOPRIL 10 MG: 10 TABLET ORAL at 09:02

## 2023-02-06 RX ADMIN — METHYLPREDNISOLONE 4 MG: 4 TABLET ORAL at 09:02

## 2023-02-06 RX ADMIN — CEFTRIAXONE SODIUM 1 G: 1 INJECTION, POWDER, FOR SOLUTION INTRAMUSCULAR; INTRAVENOUS at 09:02

## 2023-02-06 RX ADMIN — METHYLPREDNISOLONE 4 MG: 4 TABLET ORAL at 05:02

## 2023-02-06 RX ADMIN — METHYLPREDNISOLONE 4 MG: 4 TABLET ORAL at 01:02

## 2023-02-06 NOTE — PROGRESS NOTES
[facility] Medicine  Progress Note    Patient Name: Shira Morales  MRN: [id number]  Patient Class: IP- Inpatient   Admission Date: 2/2/2023  Length of Stay: 3 days  Attending Physician: Anne Marie Iyer MD  Primary Care Provider: Primary Doctor No        Subjective:     Principal Problem:COPD exacerbation        HPI:  71 yo female with hx of COPD continues to smoke, HTN, Depression presents with 3 day hx of productive cough yellow green, fever, urinary symptoms. Stating she has been coughing SOB, wheezing, No N/V/D.      Pt is Full code SDM is her daughter   Pt is see and examined via Telemed. Pt is in [facility]. I am in [place], [place]. Nursing staff assisted with evaluation. Software is Audio/ Video  Pt is being admitted as an observation for further evaluation and treatment      Overview/Hospital Course:  February 3, 2023 patient doing better.  Less wheezing.  She is admitting to still smoking 8 cigarettes per day.  Smoking cessation counseling done.  February 4, 2023 patient is doing great only minimal shortness of breath and breathing treatments  February 5, 2023 patient doing well urine culture no growth sputum culture pending.  Tolerating IV steroids and IV antibiotics  February 6, 2023 patient doing well.  Continues to require O2.  Room air pulse ox 82%.  Patient will need home O2      Interval History:     Review of Systems   Constitutional:  Negative for activity change, appetite change, chills, diaphoresis, fatigue and fever.   HENT:  Negative for congestion, ear pain, rhinorrhea and sore throat.    Eyes:  Negative for pain, discharge and redness.   Respiratory:  Positive for shortness of breath and wheezing. Negative for apnea, cough, choking and chest tightness.    Cardiovascular:  Negative for chest pain, palpitations and leg swelling.   Gastrointestinal:  Negative for abdominal distention, abdominal pain, blood in stool, constipation, diarrhea, nausea and vomiting.    Endocrine: Negative for cold intolerance, heat intolerance and polyuria.   Genitourinary:  Negative for difficulty urinating, dysuria, frequency, hematuria and pelvic pain.   Musculoskeletal:  Negative for arthralgias, back pain, gait problem, joint swelling and myalgias.   Skin:  Negative for color change, pallor, rash and wound.   Neurological:  Negative for seizures, syncope, speech difficulty, numbness and headaches.   Psychiatric/Behavioral:  Negative for agitation, confusion, hallucinations and sleep disturbance. The patient is not nervous/anxious.    Objective:     Vital Signs (Most Recent):  Temp: 97.6 °F (36.4 °C) (02/06/23 0701)  Pulse: 68 (02/06/23 0752)  Resp: 18 (02/06/23 0752)  BP: (!) 180/87 (02/06/23 0701)  SpO2: (!) 92 % (02/06/23 0752)   Vital Signs (24h Range):  Temp:  [96.9 °F (36.1 °C)-98.4 °F (36.9 °C)] 97.6 °F (36.4 °C)  Pulse:  [59-80] 68  Resp:  [18-20] 18  SpO2:  [91 %-95 %] 92 %  BP: (114-180)/(60-91) 180/87     Weight: 66.7 kg (147 lb)  Body mass index is 23.02 kg/m².    Intake/Output Summary (Last 24 hours) at 2/6/2023 0904  Last data filed at 2/5/2023 0924  Gross per 24 hour   Intake 50 ml   Output --   Net 50 ml        Physical Exam  Constitutional:       General: She is not in acute distress.     Appearance: Normal appearance. She is not ill-appearing, toxic-appearing or diaphoretic.   HENT:      Head: Normocephalic and atraumatic.      Right Ear: External ear normal.      Left Ear: External ear normal.      Nose: Nose normal. No congestion or rhinorrhea.      Mouth/Throat:      Mouth: Mucous membranes are moist.      Pharynx: Oropharynx is clear.   Eyes:      Extraocular Movements: Extraocular movements intact.      Conjunctiva/sclera: Conjunctivae normal.      Pupils: Pupils are equal, round, and reactive to light.   Neck:      Vascular: No carotid bruit.   Cardiovascular:      Rate and Rhythm: Normal rate and regular rhythm.      Pulses: Normal pulses.      Heart sounds: Normal  heart sounds. No murmur heard.  Pulmonary:      Effort: Pulmonary effort is normal. No respiratory distress.      Breath sounds: Wheezing and rhonchi present. No rales.   Abdominal:      General: Abdomen is flat. Bowel sounds are normal. There is no distension.      Palpations: Abdomen is soft.      Tenderness: There is no abdominal tenderness. There is no guarding.   Musculoskeletal:         General: No swelling or tenderness. Normal range of motion.      Cervical back: Normal range of motion and neck supple. No tenderness.      Right lower leg: No edema.      Left lower leg: No edema.   Lymphadenopathy:      Cervical: No cervical adenopathy.   Skin:     General: Skin is warm and dry.      Coloration: Skin is not jaundiced or pale.   Neurological:      General: No focal deficit present.      Mental Status: She is alert and oriented to person, place, and time. Mental status is at baseline.      Cranial Nerves: No cranial nerve deficit.      Motor: No weakness.      Coordination: Coordination normal.      Gait: Gait normal.   Psychiatric:         Mood and Affect: Mood normal.         Behavior: Behavior normal.         Thought Content: Thought content normal.         Judgment: Judgment normal.       Significant Labs: All pertinent labs within the past 24 hours have been reviewed.    Significant Imaging: I have reviewed all pertinent imaging results/findings within the past 24 hours.      Assessment/Plan:      * COPD exacerbation  DC Solu-Medrol and start Medrol Dosepak    Follow-up sputum culture.  Arrange for home O2 at 2 liters/minute nasal cannula.  Possible discharge home tomorrow if oxygen can be set up    Depression  Patient has persistent depression which is moderate and is currently controlled. Will Continue anti-depressant medications. We will not consult psychiatry at this time. Patient does not display psychosis at this time. Continue to monitor closely and adjust plan of care as needed.        HTN  (hypertension)  Low-salt diet.  Increase lisinopril to 10 mg p.o. daily.  Continue to monitor blood pressure.        VTE Risk Mitigation (From admission, onward)         Ordered     enoxaparin injection 40 mg  Daily         02/02/23 1642     IP VTE HIGH RISK PATIENT  Once         02/02/23 1642                Discharge Planning   INGA:      Code Status: Full Code   Is the patient medically ready for discharge?:     Reason for patient still in hospital (select all that apply): Patient unstable  Discharge Plan A: Home                  Georgi Jackson MD  Department of Hospital Medicine   [facility]

## 2023-02-06 NOTE — PLAN OF CARE
02/06/23 1602   Medicare Message   Important Message from Medicare regarding Discharge Appeal Rights Given to patient/caregiver;Explained to patient/caregiver;Signed/date by patient/caregiver   Date IMM was signed 02/03/23

## 2023-02-06 NOTE — PLAN OF CARE
Problem: Adult Inpatient Plan of Care  Goal: Plan of Care Review  Outcome: Ongoing, Progressing  Goal: Patient-Specific Goal (Individualized)  Outcome: Ongoing, Progressing  Goal: Absence of Hospital-Acquired Illness or Injury  Outcome: Ongoing, Progressing  Goal: Optimal Comfort and Wellbeing  Outcome: Ongoing, Progressing  Goal: Readiness for Transition of Care  Outcome: Ongoing, Progressing     Problem: Fall Injury Risk  Goal: Absence of Fall and Fall-Related Injury  Outcome: Ongoing, Progressing     Problem: Adjustment to Illness COPD (Chronic Obstructive Pulmonary Disease)  Goal: Optimal Chronic Illness Coping  Outcome: Ongoing, Progressing     Problem: Functional Ability Impaired COPD (Chronic Obstructive Pulmonary Disease)  Goal: Optimal Level of Functional [place]  Outcome: Ongoing, Progressing     Problem: Infection COPD (Chronic Obstructive Pulmonary Disease)  Goal: Absence of Infection Signs and Symptoms  Outcome: Ongoing, Progressing     Problem: Oral Intake Inadequate COPD (Chronic Obstructive Pulmonary Disease)  Goal: Improved Nutrition Intake  Outcome: Ongoing, Progressing     Problem: Infection  Goal: Absence of Infection Signs and Symptoms  Outcome: Ongoing, Progressing

## 2023-02-06 NOTE — PLAN OF CARE
Physician ordered home oxygen set-up for patient. Referral made to [facility] [employer] for oxygen. Patient to possibly be discharged tomorrow.

## 2023-02-06 NOTE — PLAN OF CARE
Problem: Adult Inpatient Plan of Care  Goal: Plan of Care Review  Outcome: Ongoing, Progressing  Goal: Patient-Specific Goal (Individualized)  Outcome: Ongoing, Progressing  Goal: Absence of Hospital-Acquired Illness or Injury  Outcome: Ongoing, Progressing  Goal: Optimal Comfort and Wellbeing  Outcome: Ongoing, Progressing  Goal: Readiness for Transition of Care  Outcome: Ongoing, Progressing     Problem: Fall Injury Risk  Goal: Absence of Fall and Fall-Related Injury  Outcome: Ongoing, Progressing     Problem: Adjustment to Illness COPD (Chronic Obstructive Pulmonary Disease)  Goal: Optimal Chronic Illness Coping  Outcome: Ongoing, Progressing     Problem: Functional Ability Impaired COPD (Chronic Obstructive Pulmonary Disease)  Goal: Optimal Level of Functional [place]  Outcome: Ongoing, Progressing     Problem: Infection COPD (Chronic Obstructive Pulmonary Disease)  Goal: Absence of Infection Signs and Symptoms  Outcome: Ongoing, Progressing     Problem: Oral Intake Inadequate COPD (Chronic Obstructive Pulmonary Disease)  Goal: Improved Nutrition Intake  Outcome: Ongoing, Progressing     Problem: Respiratory Compromise COPD (Chronic Obstructive Pulmonary Disease)  Goal: Effective Oxygenation and Ventilation  Outcome: Ongoing, Progressing     Problem: Infection  Goal: Absence of Infection Signs and Symptoms  Outcome: Ongoing, Progressing

## 2023-02-06 NOTE — ASSESSMENT & PLAN NOTE
DC Solu-Medrol and start Medrol Dosepak    Follow-up sputum culture.  Arrange for home O2 at 2 liters/minute nasal cannula.  Possible discharge home tomorrow if oxygen can be set up

## 2023-02-06 NOTE — SUBJECTIVE & OBJECTIVE
Interval History:     Review of Systems   Constitutional:  Negative for activity change, appetite change, chills, diaphoresis, fatigue and fever.   HENT:  Negative for congestion, ear pain, rhinorrhea and sore throat.    Eyes:  Negative for pain, discharge and redness.   Respiratory:  Positive for shortness of breath and wheezing. Negative for apnea, cough, choking and chest tightness.    Cardiovascular:  Negative for chest pain, palpitations and leg swelling.   Gastrointestinal:  Negative for abdominal distention, abdominal pain, blood in stool, constipation, diarrhea, nausea and vomiting.   Endocrine: Negative for cold intolerance, heat intolerance and polyuria.   Genitourinary:  Negative for difficulty urinating, dysuria, frequency, hematuria and pelvic pain.   Musculoskeletal:  Negative for arthralgias, back pain, gait problem, joint swelling and myalgias.   Skin:  Negative for color change, pallor, rash and wound.   Neurological:  Negative for seizures, syncope, speech difficulty, numbness and headaches.   Psychiatric/Behavioral:  Negative for agitation, confusion, hallucinations and sleep disturbance. The patient is not nervous/anxious.    Objective:     Vital Signs (Most Recent):  Temp: 97.6 °F (36.4 °C) (02/06/23 0701)  Pulse: 68 (02/06/23 0752)  Resp: 18 (02/06/23 0752)  BP: (!) 180/87 (02/06/23 0701)  SpO2: (!) 92 % (02/06/23 0752)   Vital Signs (24h Range):  Temp:  [96.9 °F (36.1 °C)-98.4 °F (36.9 °C)] 97.6 °F (36.4 °C)  Pulse:  [59-80] 68  Resp:  [18-20] 18  SpO2:  [91 %-95 %] 92 %  BP: (114-180)/(60-91) 180/87     Weight: 66.7 kg (147 lb)  Body mass index is 23.02 kg/m².    Intake/Output Summary (Last 24 hours) at 2/6/2023 0904  Last data filed at 2/5/2023 0924  Gross per 24 hour   Intake 50 ml   Output --   Net 50 ml        Physical Exam  Constitutional:       General: She is not in acute distress.     Appearance: Normal appearance. She is not ill-appearing, toxic-appearing or diaphoretic.   HENT:       Head: Normocephalic and atraumatic.      Right Ear: External ear normal.      Left Ear: External ear normal.      Nose: Nose normal. No congestion or rhinorrhea.      Mouth/Throat:      Mouth: Mucous membranes are moist.      Pharynx: Oropharynx is clear.   Eyes:      Extraocular Movements: Extraocular movements intact.      Conjunctiva/sclera: Conjunctivae normal.      Pupils: Pupils are equal, round, and reactive to light.   Neck:      Vascular: No carotid bruit.   Cardiovascular:      Rate and Rhythm: Normal rate and regular rhythm.      Pulses: Normal pulses.      Heart sounds: Normal heart sounds. No murmur heard.  Pulmonary:      Effort: Pulmonary effort is normal. No respiratory distress.      Breath sounds: Wheezing and rhonchi present. No rales.   Abdominal:      General: Abdomen is flat. Bowel sounds are normal. There is no distension.      Palpations: Abdomen is soft.      Tenderness: There is no abdominal tenderness. There is no guarding.   Musculoskeletal:         General: No swelling or tenderness. Normal range of motion.      Cervical back: Normal range of motion and neck supple. No tenderness.      Right lower leg: No edema.      Left lower leg: No edema.   Lymphadenopathy:      Cervical: No cervical adenopathy.   Skin:     General: Skin is warm and dry.      Coloration: Skin is not jaundiced or pale.   Neurological:      General: No focal deficit present.      Mental Status: She is alert and oriented to person, place, and time. Mental status is at baseline.      Cranial Nerves: No cranial nerve deficit.      Motor: No weakness.      Coordination: Coordination normal.      Gait: Gait normal.   Psychiatric:         Mood and Affect: Mood normal.         Behavior: Behavior normal.         Thought Content: Thought content normal.         Judgment: Judgment normal.       Significant Labs: All pertinent labs within the past 24 hours have been reviewed.    Significant Imaging: I have reviewed all pertinent  imaging results/findings within the past 24 hours.

## 2023-02-07 VITALS
WEIGHT: 147 LBS | RESPIRATION RATE: 18 BRPM | TEMPERATURE: 98 F | HEART RATE: 64 BPM | OXYGEN SATURATION: 91 % | BODY MASS INDEX: 23.07 KG/M2 | DIASTOLIC BLOOD PRESSURE: 80 MMHG | SYSTOLIC BLOOD PRESSURE: 189 MMHG | HEIGHT: 67 IN

## 2023-02-07 PROBLEM — J44.1 COPD EXACERBATION: Status: RESOLVED | Noted: 2023-02-03 | Resolved: 2023-02-07

## 2023-02-07 LAB
ANION GAP SERPL CALC-SCNC: 1 MEQ/L (ref 2–13)
BACTERIA BLD CULT: NORMAL
BASOPHILS # BLD AUTO: 0.04 X10(3)/MCL (ref 0.01–0.08)
BASOPHILS NFR BLD AUTO: 0.4 % (ref 0.1–1.2)
BUN SERPL-MCNC: 37 MG/DL (ref 7–20)
CALCIUM SERPL-MCNC: 8.5 MG/DL (ref 8.4–10.2)
CHLORIDE SERPL-SCNC: 101 MMOL/L (ref 98–110)
CO2 SERPL-SCNC: 34 MMOL/L (ref 21–32)
CREAT SERPL-MCNC: 0.73 MG/DL (ref 0.66–1.25)
CREAT/UREA NIT SERPL: 51 (ref 12–20)
EOSINOPHIL # BLD AUTO: 0 X10(3)/MCL (ref 0.04–0.36)
EOSINOPHIL NFR BLD AUTO: 0 % (ref 0.7–7)
ERYTHROCYTE [DISTWIDTH] IN BLOOD BY AUTOMATED COUNT: 12.8 % (ref 11–14.5)
GFR SERPLBLD CREATININE-BSD FMLA CKD-EPI: 89 MLS/MIN/1.73/M2
GLUCOSE SERPL-MCNC: 93 MG/DL (ref 70–115)
HCT VFR BLD AUTO: 41.1 % (ref 36–48)
HGB BLD-MCNC: 14.1 GM/DL (ref 11.8–16)
IMM GRANULOCYTES # BLD AUTO: 0.18 X10(3)/MCL (ref 0–0.03)
IMM GRANULOCYTES NFR BLD AUTO: 1.8 % (ref 0–0.5)
LYMPHOCYTES # BLD AUTO: 2.5 X10(3)/MCL (ref 1.16–3.74)
LYMPHOCYTES NFR BLD AUTO: 24.6 % (ref 20–55)
MCH RBC QN AUTO: 31.9 PG (ref 27–34)
MCV RBC AUTO: 93 FL (ref 79–99)
MEAN CELL HEMOGLOBIN CONCENTRATION (OHS) G/DL: 34.3 G/DL (ref 31–37)
MONOCYTES # BLD AUTO: 1.1 X10(3)/MCL (ref 0.24–0.36)
MONOCYTES NFR BLD AUTO: 10.8 % (ref 4.7–12.5)
NEUTROPHILS # BLD AUTO: 6.34 X10(3)/MCL (ref 1.56–6.13)
NEUTROPHILS NFR BLD AUTO: 62.4 % (ref 37–73)
NRBC BLD AUTO-RTO: 0 % (ref 0–1)
PLATELET # BLD AUTO: 291 X10(3)/MCL (ref 140–371)
PMV BLD AUTO: 9.1 FL (ref 9.4–12.4)
POTASSIUM SERPL-SCNC: 4.9 MMOL/L (ref 3.5–5.1)
RBC # BLD AUTO: 4.42 X10(6)/MCL (ref 4–5.1)
SODIUM SERPL-SCNC: 136 MMOL/L (ref 135–145)
WBC # SPEC AUTO: 10.2 X10(3)/MCL (ref 4–11.5)

## 2023-02-07 PROCEDURE — 36415 COLL VENOUS BLD VENIPUNCTURE: CPT | Performed by: FAMILY MEDICINE

## 2023-02-07 PROCEDURE — 63700000 PHARM REV CODE 250 ALT 637 W/O HCPCS: Performed by: FAMILY MEDICINE

## 2023-02-07 PROCEDURE — 99900035 HC TECH TIME PER 15 MIN (STAT)

## 2023-02-07 PROCEDURE — 25000003 PHARM REV CODE 250: Performed by: INTERNAL MEDICINE

## 2023-02-07 PROCEDURE — 25000242 PHARM REV CODE 250 ALT 637 W/ HCPCS: Performed by: FAMILY MEDICINE

## 2023-02-07 PROCEDURE — 63600175 PHARM REV CODE 636 W HCPCS: Performed by: FAMILY MEDICINE

## 2023-02-07 PROCEDURE — 85025 COMPLETE CBC W/AUTO DIFF WBC: CPT | Performed by: FAMILY MEDICINE

## 2023-02-07 PROCEDURE — 94761 N-INVAS EAR/PLS OXIMETRY MLT: CPT

## 2023-02-07 PROCEDURE — 25000003 PHARM REV CODE 250: Performed by: FAMILY MEDICINE

## 2023-02-07 PROCEDURE — 27000221 HC OXYGEN, UP TO 24 HOURS

## 2023-02-07 PROCEDURE — 94640 AIRWAY INHALATION TREATMENT: CPT

## 2023-02-07 PROCEDURE — 80048 BASIC METABOLIC PNL TOTAL CA: CPT | Performed by: FAMILY MEDICINE

## 2023-02-07 PROCEDURE — 63600175 PHARM REV CODE 636 W HCPCS: Performed by: INTERNAL MEDICINE

## 2023-02-07 RX ORDER — METHYLPREDNISOLONE 4 MG/1
4 TABLET ORAL ONCE
Qty: 1 TABLET | Refills: 0 | Status: SHIPPED | OUTPATIENT
Start: 2023-02-08 | End: 2023-02-08

## 2023-02-07 RX ORDER — METHYLPREDNISOLONE 4 MG/1
4 TABLET ORAL ONCE
Qty: 1 TABLET | Refills: 0 | Status: SHIPPED | OUTPATIENT
Start: 2023-02-10 | End: 2023-02-10

## 2023-02-07 RX ORDER — METHYLPREDNISOLONE 4 MG/1
4 TABLET ORAL ONCE
Qty: 1 TABLET | Refills: 0 | Status: SHIPPED | OUTPATIENT
Start: 2023-02-09 | End: 2023-02-09

## 2023-02-07 RX ORDER — LEVOFLOXACIN 500 MG/1
500 TABLET, FILM COATED ORAL DAILY
Qty: 5 TABLET | Refills: 0 | Status: SHIPPED | OUTPATIENT
Start: 2023-02-07 | End: 2023-02-12

## 2023-02-07 RX ORDER — METHYLPREDNISOLONE 4 MG/1
4 TABLET ORAL ONCE
Qty: 1 TABLET | Refills: 0 | Status: SHIPPED | OUTPATIENT
Start: 2023-02-07 | End: 2023-02-07

## 2023-02-07 RX ORDER — LISINOPRIL 10 MG/1
10 TABLET ORAL DAILY
Qty: 90 TABLET | Refills: 3 | Status: SHIPPED | OUTPATIENT
Start: 2023-02-08 | End: 2024-02-08

## 2023-02-07 RX ADMIN — GUAIFENESIN 600 MG: 600 TABLET, EXTENDED RELEASE ORAL at 08:02

## 2023-02-07 RX ADMIN — LISINOPRIL 10 MG: 10 TABLET ORAL at 08:02

## 2023-02-07 RX ADMIN — METHYLPREDNISOLONE 4 MG: 4 TABLET ORAL at 12:02

## 2023-02-07 RX ADMIN — ALBUTEROL SULFATE 2.5 MG: 2.5 SOLUTION RESPIRATORY (INHALATION) at 07:02

## 2023-02-07 RX ADMIN — AZITHROMYCIN MONOHYDRATE 250 MG: 250 TABLET ORAL at 08:02

## 2023-02-07 RX ADMIN — ACETAMINOPHEN 650 MG: 325 TABLET, FILM COATED ORAL at 09:02

## 2023-02-07 RX ADMIN — PAROXETINE 10 MG: 10 TABLET, FILM COATED ORAL at 08:02

## 2023-02-07 RX ADMIN — CEFTRIAXONE SODIUM 1 G: 1 INJECTION, POWDER, FOR SOLUTION INTRAMUSCULAR; INTRAVENOUS at 08:02

## 2023-02-07 RX ADMIN — METHYLPREDNISOLONE 4 MG: 4 TABLET ORAL at 08:02

## 2023-02-07 RX ADMIN — BUDESONIDE INHALATION 0.5 MG: 0.5 SUSPENSION RESPIRATORY (INHALATION) at 07:02

## 2023-02-07 NOTE — PLAN OF CARE
Physician ordered to discharge patient home. Patient rec'd the portable oxygen tank yesterday for discharge home today.

## 2023-02-07 NOTE — DISCHARGE INSTRUCTIONS
Take medications as prescribed. Keep follow-up appointments. Practice deep-breathing exercises. Drink plenty of fluid/ change body position often to help excrete secretions  Smoking reduction/cessation is required to help prevent reoccurrences. No smoking while oxygen is in use. Keep oxygen source away from open flame because it is a highly flammable substance.

## 2023-02-07 NOTE — DISCHARGE SUMMARY
[facility]/Surg  [facility] Medicine  Discharge Summary      Patient Name: Shira Morales  MRN: [id number]  ROBBIE: [id number]  Patient Class: IP- Inpatient  Admission Date: 2/2/2023  Hospital Length of Stay: 4 days  Discharge Date and Time:  02/07/2023 12:59 PM  Attending Physician: Anne Marie Iyer MD   Discharging Provider: Georgi Jackson MD  Primary Care Provider: Primary Doctor No    Primary Care Team: Networked reference to record PCT     HPI:   73 yo female with hx of COPD continues to smoke, HTN, Depression presents with 3 day hx of productive cough yellow green, fever, urinary symptoms. Stating she has been coughing SOB, wheezing, No N/V/D.      Pt is Full code SDM is her daughter   Pt is see and examined via Telemed. Pt is in [facility]. I am in [place], [place]. Nursing staff assisted with evaluation. Software is Audio/ Video  Pt is being admitted as an observation for further evaluation and treatment      * No surgery found *      Hospital Course:   February 3, 2023 patient doing better.  Less wheezing.  She is admitting to still smoking 8 cigarettes per day.  Smoking cessation counseling done.  February 4, 2023 patient is doing great only minimal shortness of breath and breathing treatments  February 5, 2023 patient doing well urine culture no growth sputum culture pending.  Tolerating IV steroids and IV antibiotics  February 6, 2023 patient doing well.  Continues to require O2.  Room air pulse ox 82%.  Patient will need home O2  February 7, 2023 patient breathing back to baseline.  She is qualified for home O2 and will be sent home with O2 at 2 liters/minute nasal cannula.  Sputum culture showed normal hill.  To be sent home on p.o. Levaquin and completion of her Medrol Dosepak your she will follow-up with her pulmonologist in 1-2 weeks.  She will follow up with her primary care provider in 1 week.       Goals of Care Treatment Preferences:  Code Status: Full Code      Consults:     No new  Assessment & Plan notes have been filed under this hospital service since the last note was generated.  Service: Hospital Medicine    Final Active Diagnoses:    Diagnosis Date Noted POA    HTN (hypertension) [I10] 02/03/2023 Yes    Depression [F32.A] 02/03/2023 Yes      Problems Resolved During this Admission:    Diagnosis Date Noted Date Resolved POA    PRINCIPAL PROBLEM:  COPD exacerbation [J44.1] 02/03/2023 02/07/2023 Unknown       Discharged Condition: good    Disposition: Home or Self Care    Follow Up:   Follow-up Information     Primary Doctor No Follow up.                     Patient Instructions:   No discharge procedures on file.    Significant Diagnostic Studies:     Pending Diagnostic Studies:     None         Medications:  Reconciled Home Medications:      Medication List      START taking these medications    levoFLOXacin 500 MG tablet  Commonly known as: LEVAQUIN  Take 1 tablet (500 mg total) by mouth once daily. for 5 days     * methylPREDNISolone 4 MG Tab  Commonly known as: MEDROL  Take 1 tablet (4 mg total) by mouth once. for 1 dose     * methylPREDNISolone 4 MG Tab  Commonly known as: MEDROL  Take 1 tablet (4 mg total) by mouth once. for 1 dose     * methylPREDNISolone 4 MG Tab  Commonly known as: MEDROL  Take 1 tablet (4 mg total) by mouth once. for 1 dose  Start taking on: February 8, 2023     * methylPREDNISolone 4 MG Tab  Commonly known as: MEDROL  Take 1 tablet (4 mg total) by mouth once. for 1 dose  Start taking on: February 8, 2023     * methylPREDNISolone 4 MG Tab  Commonly known as: MEDROL  Take 1 tablet (4 mg total) by mouth once. for 1 dose  Start taking on: February 8, 2023     * methylPREDNISolone 4 MG Tab  Commonly known as: MEDROL  Take 1 tablet (4 mg total) by mouth once. for 1 dose  Start taking on: February 9, 2023     * methylPREDNISolone 4 MG Tab  Commonly known as: MEDROL  Take 1 tablet (4 mg total) by mouth once. for 1 dose  Start taking on: February 9, 2023     *  methylPREDNISolone 4 MG Tab  Commonly known as: MEDROL  Take 1 tablet (4 mg total) by mouth once. for 1 dose  Start taking on: February 10, 2023         * This list has 8 medication(s) that are the same as other medications prescribed for you. Read the directions carefully, and ask your doctor or other care provider to review them with you.            CHANGE how you take these medications    lisinopriL 10 MG tablet  Take 1 tablet (10 mg total) by mouth once daily.  Start taking on: February 8, 2023  What changed:   · medication strength  · how much to take  · when to take this        CONTINUE taking these medications    * albuterol 90 mcg/actuation inhaler  Commonly known as: PROVENTIL/VENTOLIN HFA  2 puffs every 6 (six) hours as needed.     * albuterol 2.5 mg /3 mL (0.083 %) nebulizer solution  Commonly known as: PROVENTIL  3 mLs.     * albuterol 90 mcg/actuation inhaler  Commonly known as: PROVENTIL/VENTOLIN HFA  Ventolin HFA 90 mcg/actuation aerosol inhaler     budesonide 0.5 mg/2 mL nebulizer solution  Commonly known as: PULMICORT  Take by nebulization 2 (two) times daily.     cholestyramine 4 gram packet  Commonly known as: QUESTRAN  cholestyramine (with sugar) 4 gram powder for susp in a packet     diclofenac 75 MG EC tablet  Commonly known as: VOLTAREN  Take 75 mg by mouth once daily.     paroxetine 10 MG tablet  Commonly known as: PAXIL  Take 10 mg by mouth once daily.     pravastatin 40 MG tablet  Commonly known as: PRAVACHOL  Take 40 mg by mouth every evening.         * This list has 3 medication(s) that are the same as other medications prescribed for you. Read the directions carefully, and ask your doctor or other care provider to review them with you.                Indwelling Lines/Drains at time of discharge:   Lines/Drains/Airways     None                 Time spent on the discharge of patient: 35 minutes         Georgi Jackson MD  Department of Hospital Medicine  [facility]

## 2023-02-07 NOTE — PLAN OF CARE
02/07/23 1332   Final Note   Assessment Type Discharge Planning Assessment   Anticipated Discharge Disposition Home  (Home Oxygen)   What phone number can be called within the next 1-3 days to see how you are doing after discharge? [phone]   Hospital Resources/Appts/Education Provided Provided patient/caregiver with written discharge plan information   Post-Acute Status   Discharge Delays None known at this time

## 2023-02-07 NOTE — PLAN OF CARE
Problem: Adult Inpatient Plan of Care  Goal: Plan of Care Review  Outcome: Ongoing, Not Progressing  Goal: Patient-Specific Goal (Individualized)  Outcome: Ongoing, Not Progressing  Goal: Absence of Hospital-Acquired Illness or Injury  Outcome: Ongoing, Not Progressing  Goal: Optimal Comfort and Wellbeing  Outcome: Ongoing, Not Progressing  Goal: Readiness for Transition of Care  Outcome: Ongoing, Not Progressing     Problem: Fall Injury Risk  Goal: Absence of Fall and Fall-Related Injury  Outcome: Ongoing, Not Progressing     Problem: Adjustment to Illness COPD (Chronic Obstructive Pulmonary Disease)  Goal: Optimal Chronic Illness Coping  Outcome: Ongoing, Not Progressing     Problem: Functional Ability Impaired COPD (Chronic Obstructive Pulmonary Disease)  Goal: Optimal Level of Functional [place]  Outcome: Ongoing, Not Progressing     Problem: Infection COPD (Chronic Obstructive Pulmonary Disease)  Goal: Absence of Infection Signs and Symptoms  Outcome: Ongoing, Not Progressing     Problem: Oral Intake Inadequate COPD (Chronic Obstructive Pulmonary Disease)  Goal: Improved Nutrition Intake  Outcome: Ongoing, Not Progressing     Problem: Respiratory Compromise COPD (Chronic Obstructive Pulmonary Disease)  Goal: Effective Oxygenation and Ventilation  Outcome: Ongoing, Not Progressing     Problem: Infection  Goal: Absence of Infection Signs and Symptoms  Outcome: Ongoing, Not Progressing

## 2023-02-07 NOTE — PLAN OF CARE
Problem: Adult Inpatient Plan of Care  Goal: Plan of Care Review  Outcome: Adequate for Care Transition  Goal: Patient-Specific Goal (Individualized)  Outcome: Adequate for Care Transition  Goal: Absence of Hospital-Acquired Illness or Injury  Outcome: Adequate for Care Transition  Goal: Optimal Comfort and Wellbeing  Outcome: Adequate for Care Transition  Goal: Readiness for Transition of Care  Outcome: Adequate for Care Transition     Problem: Fall Injury Risk  Goal: Absence of Fall and Fall-Related Injury  Outcome: Adequate for Care Transition     Problem: Adjustment to Illness COPD (Chronic Obstructive Pulmonary Disease)  Goal: Optimal Chronic Illness Coping  Outcome: Adequate for Care Transition     Problem: Functional Ability Impaired COPD (Chronic Obstructive Pulmonary Disease)  Goal: Optimal Level of Functional [place]  Outcome: Adequate for Care Transition     Problem: Infection COPD (Chronic Obstructive Pulmonary Disease)  Goal: Absence of Infection Signs and Symptoms  Outcome: Adequate for Care Transition     Problem: Oral Intake Inadequate COPD (Chronic Obstructive Pulmonary Disease)  Goal: Improved Nutrition Intake  Outcome: Adequate for Care Transition     Problem: Respiratory Compromise COPD (Chronic Obstructive Pulmonary Disease)  Goal: Effective Oxygenation and Ventilation  Outcome: Adequate for Care Transition     Problem: Infection  Goal: Absence of Infection Signs and Symptoms  Outcome: Adequate for Care Transition

## 2023-02-08 LAB
BACTERIA BLD CULT: NORMAL
BACTERIA SPEC CULT: NORMAL
GRAM STN SPEC: NORMAL
GRAM STN SPEC: NORMAL